# Patient Record
Sex: MALE | Race: WHITE | NOT HISPANIC OR LATINO | Employment: OTHER | URBAN - METROPOLITAN AREA
[De-identification: names, ages, dates, MRNs, and addresses within clinical notes are randomized per-mention and may not be internally consistent; named-entity substitution may affect disease eponyms.]

---

## 2023-08-08 ENCOUNTER — PATIENT OUTREACH (OUTPATIENT)
Dept: CARE COORDINATION | Facility: CLINIC | Age: 88
End: 2023-08-08
Payer: MEDICARE

## 2023-08-08 NOTE — PROGRESS NOTES
Outreach call to patient to support a smooth transition of care from recent admission    Engagement  Call Start Time: 0909 (8/8/2023  9:09 AM)    Patient Teaching  What is the patient's perception of their health status since discharge?: Improving (8/8/2023  9:09 AM)  Is the patient/caregiver able to teach back the hierarchy of who to call/visit for symptoms/problems? PCP, Specialist, Home Health nurse, Urgent Care, ED, 911: Yes (8/8/2023  9:09 AM)    Wrap Up  Wrap Up Additional Comments: Pt states he is doing fine, he has already had his walk this morning and his son is a nurse that looks after him. The pt declined cm and conversa or any fu calls. (8/8/2023  9:09 AM)  Call End Time: 0910 (8/8/2023  9:09 AM)

## 2023-08-23 ENCOUNTER — HOSPITAL ENCOUNTER (OUTPATIENT)
Dept: DATA CONVERSION | Facility: HOSPITAL | Age: 88
Discharge: HOME | End: 2023-08-23
Payer: MEDICARE

## 2023-08-23 DIAGNOSIS — N39.0 URINARY TRACT INFECTION, SITE NOT SPECIFIED: ICD-10-CM

## 2023-08-23 LAB
BACTERIA SPEC CULT: NORMAL
REPORT STATUS -LH SQ DATA CONVERSION: NORMAL
SERVICE CMNT-IMP: NORMAL
SPECIMEN SOURCE: NORMAL

## 2023-09-19 ENCOUNTER — HOSPITAL ENCOUNTER (OUTPATIENT)
Dept: DATA CONVERSION | Facility: HOSPITAL | Age: 88
End: 2023-09-19

## 2023-09-19 DIAGNOSIS — M81.8 OTHER OSTEOPOROSIS WITHOUT CURRENT PATHOLOGICAL FRACTURE: ICD-10-CM

## 2023-09-26 ENCOUNTER — HOSPITAL ENCOUNTER (OUTPATIENT)
Dept: DATA CONVERSION | Facility: HOSPITAL | Age: 88
Discharge: HOME | End: 2023-09-26
Payer: MEDICARE

## 2023-09-26 DIAGNOSIS — M81.8 OTHER OSTEOPOROSIS WITHOUT CURRENT PATHOLOGICAL FRACTURE: ICD-10-CM

## 2023-09-26 DIAGNOSIS — M81.0 AGE-RELATED OSTEOPOROSIS WITHOUT CURRENT PATHOLOGICAL FRACTURE: ICD-10-CM

## 2023-10-26 PROCEDURE — 81001 URINALYSIS AUTO W/SCOPE: CPT

## 2023-10-26 PROCEDURE — 87086 URINE CULTURE/COLONY COUNT: CPT

## 2023-12-19 ENCOUNTER — LAB (OUTPATIENT)
Dept: LAB | Facility: LAB | Age: 88
End: 2023-12-19
Payer: MEDICARE

## 2023-12-19 DIAGNOSIS — K21.9 GASTRO-ESOPHAGEAL REFLUX DISEASE WITHOUT ESOPHAGITIS: ICD-10-CM

## 2023-12-19 DIAGNOSIS — Z79.899 OTHER LONG TERM (CURRENT) DRUG THERAPY: ICD-10-CM

## 2023-12-19 DIAGNOSIS — I48.91 UNSPECIFIED ATRIAL FIBRILLATION (MULTI): ICD-10-CM

## 2023-12-19 DIAGNOSIS — I10 ESSENTIAL (PRIMARY) HYPERTENSION: ICD-10-CM

## 2023-12-19 DIAGNOSIS — N39.0 URINARY TRACT INFECTION, SITE NOT SPECIFIED: Primary | ICD-10-CM

## 2023-12-19 LAB
ALBUMIN SERPL-MCNC: 4.3 G/DL (ref 3.5–5)
ALP BLD-CCNC: 69 U/L (ref 35–125)
ALT SERPL-CCNC: 13 U/L (ref 5–40)
ANION GAP SERPL CALC-SCNC: 12 MMOL/L
AST SERPL-CCNC: 21 U/L (ref 5–40)
BASOPHILS # BLD AUTO: 0.02 X10*3/UL (ref 0–0.1)
BASOPHILS NFR BLD AUTO: 0.4 %
BILIRUB SERPL-MCNC: 1.1 MG/DL (ref 0.1–1.2)
BUN SERPL-MCNC: 22 MG/DL (ref 8–25)
CALCIUM SERPL-MCNC: 9.2 MG/DL (ref 8.5–10.4)
CHLORIDE SERPL-SCNC: 99 MMOL/L (ref 97–107)
CHOLEST SERPL-MCNC: 121 MG/DL (ref 133–200)
CHOLEST/HDLC SERPL: 2.6 {RATIO}
CO2 SERPL-SCNC: 26 MMOL/L (ref 24–31)
CREAT SERPL-MCNC: 0.7 MG/DL (ref 0.4–1.6)
EOSINOPHIL # BLD AUTO: 0.11 X10*3/UL (ref 0–0.4)
EOSINOPHIL NFR BLD AUTO: 2.1 %
ERYTHROCYTE [DISTWIDTH] IN BLOOD BY AUTOMATED COUNT: 13 % (ref 11.5–14.5)
GFR SERPL CREATININE-BSD FRML MDRD: 88 ML/MIN/1.73M*2
GLUCOSE SERPL-MCNC: 84 MG/DL (ref 65–99)
HCT VFR BLD AUTO: 43.2 % (ref 41–52)
HDLC SERPL-MCNC: 47 MG/DL
HGB BLD-MCNC: 15 G/DL (ref 13.5–17.5)
IMM GRANULOCYTES # BLD AUTO: 0 X10*3/UL (ref 0–0.5)
IMM GRANULOCYTES NFR BLD AUTO: 0 % (ref 0–0.9)
LDLC SERPL CALC-MCNC: 51 MG/DL (ref 65–130)
LYMPHOCYTES # BLD AUTO: 1.17 X10*3/UL (ref 0.8–3)
LYMPHOCYTES NFR BLD AUTO: 22.8 %
MCH RBC QN AUTO: 36.1 PG (ref 26–34)
MCHC RBC AUTO-ENTMCNC: 34.7 G/DL (ref 32–36)
MCV RBC AUTO: 104 FL (ref 80–100)
MONOCYTES # BLD AUTO: 0.63 X10*3/UL (ref 0.05–0.8)
MONOCYTES NFR BLD AUTO: 12.3 %
NEUTROPHILS # BLD AUTO: 3.2 X10*3/UL (ref 1.6–5.5)
NEUTROPHILS NFR BLD AUTO: 62.4 %
NRBC BLD-RTO: 0 /100 WBCS (ref 0–0)
PLATELET # BLD AUTO: 176 X10*3/UL (ref 150–450)
POTASSIUM SERPL-SCNC: 4.6 MMOL/L (ref 3.4–5.1)
PROT SERPL-MCNC: 6.4 G/DL (ref 5.9–7.9)
RBC # BLD AUTO: 4.16 X10*6/UL (ref 4.5–5.9)
SODIUM SERPL-SCNC: 137 MMOL/L (ref 133–145)
TRIGL SERPL-MCNC: 117 MG/DL (ref 40–150)
WBC # BLD AUTO: 5.1 X10*3/UL (ref 4.4–11.3)

## 2023-12-19 PROCEDURE — 85025 COMPLETE CBC W/AUTO DIFF WBC: CPT

## 2023-12-19 PROCEDURE — 80061 LIPID PANEL: CPT

## 2023-12-19 PROCEDURE — 80053 COMPREHEN METABOLIC PANEL: CPT

## 2023-12-19 PROCEDURE — 36415 COLL VENOUS BLD VENIPUNCTURE: CPT

## 2023-12-21 RX ORDER — LANOLIN ALCOHOL/MO/W.PET/CERES
1000 CREAM (GRAM) TOPICAL
COMMUNITY
Start: 2019-10-07

## 2023-12-21 RX ORDER — LISINOPRIL 2.5 MG/1
TABLET ORAL
COMMUNITY
Start: 2023-07-23 | End: 2024-01-02 | Stop reason: ALTCHOICE

## 2023-12-21 RX ORDER — APIXABAN 2.5 MG/1
1 TABLET, FILM COATED ORAL 2 TIMES DAILY
COMMUNITY
Start: 2023-03-15

## 2023-12-21 RX ORDER — METOPROLOL SUCCINATE 25 MG/1
25 TABLET, EXTENDED RELEASE ORAL 2 TIMES DAILY
COMMUNITY
Start: 2023-12-04

## 2023-12-21 RX ORDER — PANTOPRAZOLE SODIUM 40 MG/1
40 TABLET, DELAYED RELEASE ORAL
COMMUNITY
Start: 2019-10-07

## 2023-12-21 RX ORDER — ALLOPURINOL 300 MG/1
300 TABLET ORAL
COMMUNITY
Start: 2020-06-18 | End: 2024-05-20 | Stop reason: SDUPTHER

## 2023-12-29 ENCOUNTER — TELEPHONE (OUTPATIENT)
Dept: PRIMARY CARE | Facility: CLINIC | Age: 88
End: 2023-12-29
Payer: MEDICARE

## 2023-12-29 NOTE — TELEPHONE ENCOUNTER
PTS SON CALLED TO LET DDC KNOW THAT HE IS AT [facility] . WENT TO ER WEDNESDAY HAD A + COVID , UTI, AND NOW HEART ISSUES .

## 2024-01-02 ENCOUNTER — PATIENT OUTREACH (OUTPATIENT)
Dept: PRIMARY CARE | Facility: CLINIC | Age: 89
End: 2024-01-02

## 2024-01-02 ENCOUNTER — DOCUMENTATION (OUTPATIENT)
Dept: PRIMARY CARE | Facility: CLINIC | Age: 89
End: 2024-01-02

## 2024-01-02 ENCOUNTER — OFFICE VISIT (OUTPATIENT)
Dept: PRIMARY CARE | Facility: CLINIC | Age: 89
End: 2024-01-02
Payer: MEDICARE

## 2024-01-02 ENCOUNTER — APPOINTMENT (OUTPATIENT)
Dept: PRIMARY CARE | Facility: CLINIC | Age: 89
End: 2024-01-02
Payer: MEDICARE

## 2024-01-02 VITALS
BODY MASS INDEX: 22.88 KG/M2 | HEIGHT: 64 IN | WEIGHT: 134 LBS | HEART RATE: 66 BPM | SYSTOLIC BLOOD PRESSURE: 136 MMHG | DIASTOLIC BLOOD PRESSURE: 78 MMHG | OXYGEN SATURATION: 97 %

## 2024-01-02 DIAGNOSIS — U07.1 COVID-19: ICD-10-CM

## 2024-01-02 DIAGNOSIS — I48.91 ATRIAL FIBRILLATION, UNSPECIFIED TYPE (MULTI): Primary | ICD-10-CM

## 2024-01-02 DIAGNOSIS — N39.0 URINARY TRACT INFECTION WITHOUT HEMATURIA, SITE UNSPECIFIED: ICD-10-CM

## 2024-01-02 PROCEDURE — 3075F SYST BP GE 130 - 139MM HG: CPT | Performed by: FAMILY MEDICINE

## 2024-01-02 PROCEDURE — 93000 ELECTROCARDIOGRAM COMPLETE: CPT | Performed by: FAMILY MEDICINE

## 2024-01-02 PROCEDURE — 1159F MED LIST DOCD IN RCRD: CPT | Performed by: FAMILY MEDICINE

## 2024-01-02 PROCEDURE — 99495 TRANSJ CARE MGMT MOD F2F 14D: CPT | Performed by: FAMILY MEDICINE

## 2024-01-02 PROCEDURE — 1036F TOBACCO NON-USER: CPT | Performed by: FAMILY MEDICINE

## 2024-01-02 PROCEDURE — 1126F AMNT PAIN NOTED NONE PRSNT: CPT | Performed by: FAMILY MEDICINE

## 2024-01-02 PROCEDURE — 3078F DIAST BP <80 MM HG: CPT | Performed by: FAMILY MEDICINE

## 2024-01-02 PROCEDURE — 1160F RVW MEDS BY RX/DR IN RCRD: CPT | Performed by: FAMILY MEDICINE

## 2024-01-02 RX ORDER — CEPHALEXIN 500 MG/1
500 CAPSULE ORAL 4 TIMES DAILY
COMMUNITY

## 2024-01-02 ASSESSMENT — PATIENT HEALTH QUESTIONNAIRE - PHQ9
SUM OF ALL RESPONSES TO PHQ9 QUESTIONS 1 AND 2: 0
1. LITTLE INTEREST OR PLEASURE IN DOING THINGS: NOT AT ALL
2. FEELING DOWN, DEPRESSED OR HOPELESS: NOT AT ALL

## 2024-01-02 ASSESSMENT — PAIN SCALES - GENERAL: PAINLEVEL: 0-NO PAIN

## 2024-01-02 NOTE — PROGRESS NOTES
"Subjective   Patient ID: Kavon Bowers is a 89 y.o. male who presents for Follow-up ([facility] hospital for COVID & UTI).    HPI   Kavon Presents with son who gives most of the history.  Admitted to the [facility] in December 27 secondary to respiratory distress from COVID-19 infection.  He was also found to be in atrial fibrillation with a questionable Mobitz 1 seen on telemetry.  He had some generalized weakness.  He was evaluated by cardiology who felt it was just atrial fibrillation and therefore no further intervention was needed.  Treated with antivirals.  Gradually improved his strength and therefore was discharged home on December 30.  Since being at home he still feels intermittently weak.  No shortness of breath.  Slight cough.  He is eating better.Still having episodes of lightheaded or dizzy feeling.  No chest pains or palpitations.  They note that his heart rate dips in the 40s when he is feeling lightheaded or dizzy.  Blood pressure is running 128-140/70-80.Pulse ox has remained above 92    Review of Systems  All other systems have been reviewed and are negative except as noted in the HPI.     Objective   /78   Pulse 66   Ht 1.613 m (5' 3.5\")   Wt 60.8 kg (134 lb)   SpO2 97%   BMI 23.36 kg/m²     Physical Exam  Alert.  No acute distress.  Lungs are clear to auscultation bilaterally with good air movement.  Heart was irregularly irregular with a rate of 64.  Gait was steady.  No episodes of lightheaded or dizziness when going from sitting to standing or when walking to leave the office.    Assessment/Plan   Problem List Items Addressed This Visit             ICD-10-CM    Atrial fibrillation (CMS/[facility]) - Primary I48.91     Given his episodes of bradycardia along with symptoms of lightheadedness recommend holding his Toprol for the next 24 hours.  Will continue to monitor his blood pressure during this time and call if it is above 150/90.  In addition a copy of EKG was faxed to Dr. Mendoza.  They " will call him for an appointment to be seen in the next week         Relevant Orders    ECG 12 Lead (Completed)     Other Visit Diagnoses         Codes    COVID-19      Symptoms are improving.  Discussed that he does not need to test as he will continue to be positive.  He will wear a mask for an additional 5 days. U07.1    Urinary tract infection without hematuria, site unspecified      No current symptoms.  Order given in case he develops symptoms given his history of recurrent UTIs N39.0    Relevant Orders    Urinalysis with Reflex Culture and Microscopic

## 2024-01-02 NOTE — PROGRESS NOTES
Discharge Facility: [facility]  Discharge Diagnosis: Heart block  Admission Date: 12/27/2023  Discharge Date: 12/30/2023    PCP Appointment Date: 01/02/2024  Specialist Appointment Date: None  Hospital Encounter and Summary: Linked    See discharge assessment below for further details    Engagement  Call Start Time: 0950 (1/2/2024  9:59 AM)    Medications  Medications reviewed with patient/caregiver?: Yes (1/2/2024  9:59 AM)  Is the patient having any side effects they believe may be caused by any medication additions or changes?: No (1/2/2024  9:59 AM)  Does the patient have all medications ordered at discharge?: Yes (1/2/2024  9:59 AM)  Prescription Comments: No new scripts given at discharge (1/2/2024  9:59 AM)  Is the patient taking all medications as directed (includes completed medication regime)?: Yes (1/2/2024  9:59 AM)  Medication Comments: Patients wife denies any issues affording medication (1/2/2024  9:59 AM)    Appointments  Does the patient have a primary care provider?: Yes (1/2/2024  9:59 AM)  Care Management Interventions: Verified appointment date/time/provider (1/2/2024  9:59 AM)  Has the patient kept scheduled appointments due by today?: Yes (1/2/2024  9:59 AM)    Self Management  What is the home health agency?: N/A (1/2/2024  9:59 AM)  What Durable Medical Equipment (DME) was ordered?: N/A (1/2/2024  9:59 AM)    Patient Teaching  Does the patient have access to their discharge instructions?: Yes (1/2/2024  9:59 AM)  Care Management Interventions: Reviewed instructions with patient (1/2/2024  9:59 AM)  Is the patient/caregiver able to teach back the hierarchy of who to call/visit for symptoms/problems? PCP, Specialist, Home Health nurse, Urgent Care, ED, 911: Yes (1/2/2024  9:59 AM)  Patient/Caregiver Education Comments: CM spoke to patients abiola Nazario via phone. She states that he is doing okay at home but does admit to some fatigue. He has a PCP follow up appointment scheduled for today  01/02/2024 at 2:30. Mansi does not believe that this is correct. This CM called son Eliezer to discuss appointment date and time. Voicemail message was left for Eliezer requesting a return call. She has no questions at this time. (1/2/2024  9:59 AM)

## 2024-01-03 NOTE — ASSESSMENT & PLAN NOTE
Given his episodes of bradycardia along with symptoms of lightheadedness recommend holding his Toprol for the next 24 hours.  Will continue to monitor his blood pressure during this time and call if it is above 150/90.  In addition a copy of EKG was faxed to Dr. Mendoza.  They will call him for an appointment to be seen in the next week

## 2024-01-10 ENCOUNTER — TELEPHONE (OUTPATIENT)
Dept: PRIMARY CARE | Facility: CLINIC | Age: 89
End: 2024-01-10
Payer: MEDICARE

## 2024-01-10 DIAGNOSIS — I48.91 ATRIAL FIBRILLATION, UNSPECIFIED TYPE (MULTI): ICD-10-CM

## 2024-01-11 ENCOUNTER — TELEPHONE (OUTPATIENT)
Dept: PRIMARY CARE | Facility: CLINIC | Age: 89
End: 2024-01-11
Payer: MEDICARE

## 2024-01-11 ENCOUNTER — PATIENT OUTREACH (OUTPATIENT)
Dept: PRIMARY CARE | Facility: CLINIC | Age: 89
End: 2024-01-11
Payer: MEDICARE

## 2024-01-11 NOTE — TELEPHONE ENCOUNTER
Pts son called Jayson [phone] they need the phone # to have his heart monitor placed, he lost the #

## 2024-01-11 NOTE — TELEPHONE ENCOUNTER
SHUKRI HENNESSY ,   HE CALLED ABOUT HEART MONITOR ,  IT TAKES  2 WEEKS TO GET ONE , IS THERE  ANYTHING ELSE WE CAN DO TO MAKE THIS HAPPEN SOONER ?  [phone]

## 2024-01-11 NOTE — TELEPHONE ENCOUNTER
EDE FROM  TRANSITIONAL CARE IS CALLING BECAUSE THE PATIENT DOES NOT KNOW WHAT NUMBER TO CALL TO HAVE THE KRISH PATCH PLACED FOR HIM AT HIS HOME.  HE THOUGHT IT WAS ON HIS MACHINE BUT IT IS NOT. EDE ASKED IF WE COULD REACH OUT TO THE PATIENT.

## 2024-01-25 ENCOUNTER — APPOINTMENT (OUTPATIENT)
Dept: CARDIOLOGY | Facility: CLINIC | Age: 89
End: 2024-01-25
Payer: MEDICARE

## 2024-01-31 ENCOUNTER — OFFICE VISIT (OUTPATIENT)
Dept: PRIMARY CARE | Facility: CLINIC | Age: 89
End: 2024-01-31
Payer: MEDICARE

## 2024-01-31 ENCOUNTER — LAB (OUTPATIENT)
Dept: LAB | Facility: LAB | Age: 89
End: 2024-01-31
Payer: MEDICARE

## 2024-01-31 VITALS
WEIGHT: 137 LBS | HEART RATE: 57 BPM | SYSTOLIC BLOOD PRESSURE: 140 MMHG | OXYGEN SATURATION: 98 % | BODY MASS INDEX: 23.89 KG/M2 | DIASTOLIC BLOOD PRESSURE: 84 MMHG

## 2024-01-31 DIAGNOSIS — R60.0 LOCALIZED EDEMA: ICD-10-CM

## 2024-01-31 DIAGNOSIS — N39.0 RECURRENT UTI: ICD-10-CM

## 2024-01-31 DIAGNOSIS — I11.0 HYPERTENSIVE HEART DISEASE WITH HEART FAILURE (MULTI): ICD-10-CM

## 2024-01-31 DIAGNOSIS — I48.11 LONGSTANDING PERSISTENT ATRIAL FIBRILLATION (MULTI): Primary | ICD-10-CM

## 2024-01-31 DIAGNOSIS — E87.1 HYPONATREMIA: ICD-10-CM

## 2024-01-31 DIAGNOSIS — N39.0 URINARY TRACT INFECTION WITHOUT HEMATURIA, SITE UNSPECIFIED: ICD-10-CM

## 2024-01-31 DIAGNOSIS — I48.11 LONGSTANDING PERSISTENT ATRIAL FIBRILLATION (MULTI): ICD-10-CM

## 2024-01-31 LAB
ALBUMIN SERPL-MCNC: 4 G/DL (ref 3.5–5)
ALP BLD-CCNC: 69 U/L (ref 35–125)
ALT SERPL-CCNC: 15 U/L (ref 5–40)
ANION GAP SERPL CALC-SCNC: 11 MMOL/L
APPEARANCE UR: CLEAR
AST SERPL-CCNC: 17 U/L (ref 5–40)
BILIRUB SERPL-MCNC: 0.6 MG/DL (ref 0.1–1.2)
BILIRUB UR STRIP.AUTO-MCNC: NEGATIVE MG/DL
BNP SERPL-MCNC: 527 PG/ML (ref 0–99)
BUN SERPL-MCNC: 18 MG/DL (ref 8–25)
CALCIUM SERPL-MCNC: 8.7 MG/DL (ref 8.5–10.4)
CHLORIDE SERPL-SCNC: 102 MMOL/L (ref 97–107)
CO2 SERPL-SCNC: 24 MMOL/L (ref 24–31)
COLOR UR: ABNORMAL
CREAT SERPL-MCNC: 0.7 MG/DL (ref 0.4–1.6)
EGFRCR SERPLBLD CKD-EPI 2021: 87 ML/MIN/1.73M*2
GLUCOSE SERPL-MCNC: 89 MG/DL (ref 65–99)
GLUCOSE UR STRIP.AUTO-MCNC: NORMAL MG/DL
KETONES UR STRIP.AUTO-MCNC: NEGATIVE MG/DL
LEUKOCYTE ESTERASE UR QL STRIP.AUTO: ABNORMAL
NITRITE UR QL STRIP.AUTO: NEGATIVE
PH UR STRIP.AUTO: 6 [PH]
POTASSIUM SERPL-SCNC: 4.6 MMOL/L (ref 3.4–5.1)
PROT SERPL-MCNC: 6.4 G/DL (ref 5.9–7.9)
PROT UR STRIP.AUTO-MCNC: NEGATIVE MG/DL
RBC # UR STRIP.AUTO: NEGATIVE /UL
RBC #/AREA URNS AUTO: ABNORMAL /HPF
SODIUM SERPL-SCNC: 137 MMOL/L (ref 133–145)
SP GR UR STRIP.AUTO: 1.02
UROBILINOGEN UR STRIP.AUTO-MCNC: NORMAL MG/DL
WBC #/AREA URNS AUTO: ABNORMAL /HPF

## 2024-01-31 PROCEDURE — 81001 URINALYSIS AUTO W/SCOPE: CPT

## 2024-01-31 PROCEDURE — 3077F SYST BP >= 140 MM HG: CPT | Performed by: FAMILY MEDICINE

## 2024-01-31 PROCEDURE — 99214 OFFICE O/P EST MOD 30 MIN: CPT | Performed by: FAMILY MEDICINE

## 2024-01-31 PROCEDURE — 1036F TOBACCO NON-USER: CPT | Performed by: FAMILY MEDICINE

## 2024-01-31 PROCEDURE — 1126F AMNT PAIN NOTED NONE PRSNT: CPT | Performed by: FAMILY MEDICINE

## 2024-01-31 PROCEDURE — 1159F MED LIST DOCD IN RCRD: CPT | Performed by: FAMILY MEDICINE

## 2024-01-31 PROCEDURE — 83880 ASSAY OF NATRIURETIC PEPTIDE: CPT

## 2024-01-31 PROCEDURE — 36415 COLL VENOUS BLD VENIPUNCTURE: CPT

## 2024-01-31 PROCEDURE — 3079F DIAST BP 80-89 MM HG: CPT | Performed by: FAMILY MEDICINE

## 2024-01-31 PROCEDURE — 80053 COMPREHEN METABOLIC PANEL: CPT

## 2024-01-31 PROCEDURE — 1160F RVW MEDS BY RX/DR IN RCRD: CPT | Performed by: FAMILY MEDICINE

## 2024-01-31 PROCEDURE — 87086 URINE CULTURE/COLONY COUNT: CPT

## 2024-01-31 ASSESSMENT — PAIN SCALES - GENERAL: PAINLEVEL: 0-NO PAIN

## 2024-01-31 ASSESSMENT — PATIENT HEALTH QUESTIONNAIRE - PHQ9
2. FEELING DOWN, DEPRESSED OR HOPELESS: NOT AT ALL
SUM OF ALL RESPONSES TO PHQ9 QUESTIONS 1 AND 2: 0
1. LITTLE INTEREST OR PLEASURE IN DOING THINGS: NOT AT ALL

## 2024-02-01 LAB — HOLD SPECIMEN: NORMAL

## 2024-02-02 LAB — BACTERIA UR CULT: NO GROWTH

## 2024-02-04 NOTE — PROGRESS NOTES
Subjective   Patient ID: Kavon Bowers is a 89 y.o. male who presents for Follow-up (hospital).    HPI   Kavon presents with son for hospital follow up.  He developed worsening dizziness and lightheaded feeling thus taken downtown.  He was unable to get the Zio patch prior to that admission.  During hospital stay they monitored and again he had episodes of wandering atrial pacemaker and Mobitz.  Now has Zio patch on for further evaluation.  Has follow up scheduled with cardiology.  Had stopped the cephalexin as his culture was negative but had some delirium and found to have UTI.  Tx'd with abx    Review of Systems  All other systems have been reviewed and are negative except as noted in the HPI.       Objective   /84   Pulse 57   Wt 62.1 kg (137 lb)   SpO2 98%   BMI 23.89 kg/m²     Physical Exam  Vitals and nursing note reviewed.   Constitutional:       Appearance: Normal appearance.   Eyes:      Extraocular Movements: Extraocular movements intact.      Conjunctiva/sclera: Conjunctivae normal.      Pupils: Pupils are equal, round, and reactive to light.   Cardiovascular:      Rate and Rhythm: Normal rate and regular rhythm.      Heart sounds: No murmur heard.  Pulmonary:      Effort: Pulmonary effort is normal.      Breath sounds: Normal breath sounds.   Neurological:      General: No focal deficit present.      Mental Status: He is alert.   Psychiatric:         Mood and Affect: Mood normal.         Assessment/Plan   Diagnoses and all orders for this visit:  Longstanding persistent atrial fibrillation (CMS/HCC)  -     Comprehensive Metabolic Panel; Future  -     B-type natriuretic peptide; Future  - Await Zio and follow up with cardiologist  Localized edema  -     Comprehensive Metabolic Panel; Future  -     B-type natriuretic peptide; Future  - Await labs  Hyponatremia  -     Comprehensive Metabolic Panel; Future  -     B-type natriuretic peptide; Future  - Needs recheck since discharge  Hypertensive  heart disease with heart failure (CMS/HCC)  -     B-type natriuretic peptide; Future  Recurrent UTI  -     Urinalysis with Reflex Culture and Microscopic; Future  - Recommend that he resume keflex 500mg daily to prevent recurrence

## 2024-02-12 DIAGNOSIS — M81.0 OSTEOPOROSIS, UNSPECIFIED OSTEOPOROSIS TYPE, UNSPECIFIED PATHOLOGICAL FRACTURE PRESENCE: Primary | ICD-10-CM

## 2024-02-12 RX ORDER — EPINEPHRINE 0.3 MG/.3ML
0.3 INJECTION SUBCUTANEOUS EVERY 5 MIN PRN
Status: CANCELLED | OUTPATIENT
Start: 2024-02-12

## 2024-02-12 RX ORDER — DIPHENHYDRAMINE HYDROCHLORIDE 50 MG/ML
50 INJECTION INTRAMUSCULAR; INTRAVENOUS AS NEEDED
Status: CANCELLED | OUTPATIENT
Start: 2024-02-12

## 2024-02-12 RX ORDER — ALBUTEROL SULFATE 0.83 MG/ML
3 SOLUTION RESPIRATORY (INHALATION) AS NEEDED
Status: CANCELLED | OUTPATIENT
Start: 2024-02-12

## 2024-02-12 RX ORDER — FAMOTIDINE 10 MG/ML
20 INJECTION INTRAVENOUS ONCE AS NEEDED
Status: CANCELLED | OUTPATIENT
Start: 2024-02-12

## 2024-02-14 ENCOUNTER — DOCUMENTATION (OUTPATIENT)
Dept: INFUSION THERAPY | Facility: CLINIC | Age: 89
End: 2024-02-14
Payer: MEDICARE

## 2024-02-14 NOTE — PROGRESS NOTES
"  Patient to be scheduled for Continuation of Prolia injections.  For Diagnosis: Osteoporosis    Labs..  Calcium drawn on:   Lab Results   Component Value Date    CALCIUM 8.7 01/31/2024    No results found for: \"CAION\"  (>8.6mg/dl or ionized calcium WNL.  Hold / Contact provider if <8.6) (must be within 28 days of scheduled injection)  CRCL 45  Calcium and Vitamin D supplement on medication list? No    Current Outpatient Medications:     allopurinol (Zyloprim) 300 mg tablet, Take 1 tablet (300 mg) by mouth once daily., Disp: , Rfl:     cephalexin (Keflex) 500 mg capsule, Take 1 capsule (500 mg) by mouth 4 times a day., Disp: , Rfl:     cyanocobalamin (Vitamin B-12) 1,000 mcg tablet, Take 1 tablet (1,000 mcg) by mouth once daily., Disp: , Rfl:     Eliquis 2.5 mg tablet, Take 1 tablet (2.5 mg) by mouth 2 times a day., Disp: , Rfl:     metoprolol succinate XL (Toprol-XL) 25 mg 24 hr tablet, Take 1 tablet (25 mg) by mouth twice a day., Disp: , Rfl:     pantoprazole (ProtoNix) 40 mg EC tablet, Take 1 tablet (40 mg) by mouth once daily., Disp: , Rfl:    (if no nurse to encourage discussion of supplementation at visit)    No obvious recent dental work per chart review. Nurse to confirm no dental within past/next 4 weeks at encounter.  Urine Hcg test ordered?Not applicable (If female pt <60 years of age and with reproductive capability)  (If urine Hcg test ordered please instruct pt upon scheduling to drink 32 ounces of water 1 hour before arrival so bladder is full for needed urine sample)    Last injection received: 9/19/23 (if continuation)    Due: March 2024    This result meets treatment criteria. Ok to schedule for prolia within 28 days of the calcium lab draw date listed above. OR… Ok to schedule for prolia; please instruct pt to have labs drawn no more than 28 days prior to their scheduled appointment   "

## 2024-05-20 ENCOUNTER — TELEPHONE (OUTPATIENT)
Dept: PRIMARY CARE | Facility: CLINIC | Age: 89
End: 2024-05-20
Payer: MEDICARE

## 2024-05-20 DIAGNOSIS — M10.9 GOUT, UNSPECIFIED CAUSE, UNSPECIFIED CHRONICITY, UNSPECIFIED SITE: ICD-10-CM

## 2024-05-20 RX ORDER — ALLOPURINOL 300 MG/1
300 TABLET ORAL
Qty: 90 TABLET | Refills: 0 | Status: SHIPPED | OUTPATIENT
Start: 2024-05-20

## 2024-05-20 NOTE — TELEPHONE ENCOUNTER
Rx Refill Request Telephone Encounter    Name:  Kavon Bowers  :  [id number]  Medication Name:  Allopurinol 300 mg            Specific Pharmacy location:  [facility]   Date of last appointment:    Date of next appointment:    Best number to reach patient

## 2024-07-19 ENCOUNTER — TELEPHONE (OUTPATIENT)
Dept: PRIMARY CARE | Facility: CLINIC | Age: 89
End: 2024-07-19
Payer: MEDICARE

## 2024-07-19 DIAGNOSIS — R73.01 IMPAIRED FASTING GLUCOSE: ICD-10-CM

## 2024-07-19 DIAGNOSIS — I48.0 PAROXYSMAL ATRIAL FIBRILLATION WITH RVR (MULTI): ICD-10-CM

## 2024-07-19 DIAGNOSIS — M10.9 GOUT, UNSPECIFIED CAUSE, UNSPECIFIED CHRONICITY, UNSPECIFIED SITE: ICD-10-CM

## 2024-07-19 DIAGNOSIS — M81.0 OSTEOPOROSIS, UNSPECIFIED OSTEOPOROSIS TYPE, UNSPECIFIED PATHOLOGICAL FRACTURE PRESENCE: ICD-10-CM

## 2024-07-19 DIAGNOSIS — I10 ESSENTIAL HYPERTENSION: ICD-10-CM

## 2024-07-22 PROBLEM — I11.0 HYPERTENSIVE HEART DISEASE WITH HEART FAILURE (MULTI): Status: ACTIVE | Noted: 2024-01-31

## 2024-08-12 ENCOUNTER — TELEPHONE (OUTPATIENT)
Dept: PRIMARY CARE | Facility: CLINIC | Age: 89
End: 2024-08-12
Payer: MEDICARE

## 2024-08-12 DIAGNOSIS — M10.9 GOUT, UNSPECIFIED CAUSE, UNSPECIFIED CHRONICITY, UNSPECIFIED SITE: ICD-10-CM

## 2024-08-12 RX ORDER — ALLOPURINOL 300 MG/1
300 TABLET ORAL
Qty: 90 TABLET | Refills: 0 | Status: SHIPPED | OUTPATIENT
Start: 2024-08-12

## 2024-08-12 NOTE — TELEPHONE ENCOUNTER
Refill     Allopurinol 300 MG    Pharmacy [facility] in [place]    Patient can be reached at [phone]

## 2024-08-29 ENCOUNTER — OFFICE VISIT (OUTPATIENT)
Dept: PRIMARY CARE | Facility: CLINIC | Age: 89
End: 2024-08-29
Payer: MEDICARE

## 2024-08-29 VITALS
BODY MASS INDEX: 23.19 KG/M2 | OXYGEN SATURATION: 98 % | SYSTOLIC BLOOD PRESSURE: 130 MMHG | WEIGHT: 133 LBS | DIASTOLIC BLOOD PRESSURE: 78 MMHG | HEART RATE: 118 BPM | TEMPERATURE: 97.6 F

## 2024-08-29 DIAGNOSIS — R26.81 GAIT INSTABILITY: Primary | ICD-10-CM

## 2024-08-29 PROCEDURE — 1123F ACP DISCUSS/DSCN MKR DOCD: CPT | Performed by: FAMILY MEDICINE

## 2024-08-29 PROCEDURE — 1158F ADVNC CARE PLAN TLK DOCD: CPT | Performed by: FAMILY MEDICINE

## 2024-08-29 PROCEDURE — 3078F DIAST BP <80 MM HG: CPT | Performed by: FAMILY MEDICINE

## 2024-08-29 PROCEDURE — 1126F AMNT PAIN NOTED NONE PRSNT: CPT | Performed by: FAMILY MEDICINE

## 2024-08-29 PROCEDURE — 1159F MED LIST DOCD IN RCRD: CPT | Performed by: FAMILY MEDICINE

## 2024-08-29 PROCEDURE — 3075F SYST BP GE 130 - 139MM HG: CPT | Performed by: FAMILY MEDICINE

## 2024-08-29 PROCEDURE — 1160F RVW MEDS BY RX/DR IN RCRD: CPT | Performed by: FAMILY MEDICINE

## 2024-08-29 PROCEDURE — 99213 OFFICE O/P EST LOW 20 MIN: CPT | Performed by: FAMILY MEDICINE

## 2024-08-29 ASSESSMENT — PAIN SCALES - GENERAL: PAINLEVEL: 0-NO PAIN

## 2024-08-29 ASSESSMENT — PATIENT HEALTH QUESTIONNAIRE - PHQ9
SUM OF ALL RESPONSES TO PHQ9 QUESTIONS 1 AND 2: 0
2. FEELING DOWN, DEPRESSED OR HOPELESS: NOT AT ALL
1. LITTLE INTEREST OR PLEASURE IN DOING THINGS: NOT AT ALL

## 2024-08-29 NOTE — PROGRESS NOTES
Subjective   Patient ID: Kavon Bowers is a 89 y.o. male who presents for Follow-up (Er visit 8/24/24).    HPI  Kavon presents for follow-up of the emergency room.  He fell into the bushes outside and had a stick entering to the palm of his hand.  1 dissolvable stitch placed.  He is doing well overall.  No redness or drainage from any of the abrasions or the puncture wound.  He does admit to being off balance more so with his walking.  ER note reviewed in detail.    Review of Systems  All other systems have been reviewed and are negative except as noted in the HPI.       Objective   Vital Signs:  /78 (BP Location: Left arm)   Pulse (!) 118   Temp 36.4 °C (97.6 °F) (Temporal)   Wt 60.3 kg (133 lb)   SpO2 98%   BMI 23.19 kg/m²     Physical Exam  Alert.  No acute distress.  Gait is slightly unsteady.  Better with support from daughter.  Assessment & Plan  Gait instability  With fall.  Recommend physical therapy.  Will need to be done in the house due to his difficulty leaving without assistance.  Post care instructions given regarding the abrasions keeping them clean and dry.  No longer needs to keep the hand wrapped.  Orders:    Referral to Home Care; Future    ECG 12 lead (Clinic Performed)        Follow up PRN, sooner with any problems or concerns.

## 2024-09-03 ENCOUNTER — DOCUMENTATION (OUTPATIENT)
Dept: HOME HEALTH SERVICES | Facility: HOME HEALTH | Age: 89
End: 2024-09-03
Payer: MEDICARE

## 2024-09-03 ENCOUNTER — HOME HEALTH ADMISSION (OUTPATIENT)
Dept: HOME HEALTH SERVICES | Facility: HOME HEALTH | Age: 89
End: 2024-09-03
Payer: MEDICARE

## 2024-09-03 NOTE — HH CARE COORDINATION
Home Care received a Referral for Physical Therapy. We have processed the referral for a Start of Care on 09/04-09/05.     If you have any questions or concerns, please feel free to contact us at [phone]. Follow the prompts, enter your five digit zip code, and you will be directed to your care team on EAST 1.

## 2024-09-04 ENCOUNTER — HOME CARE VISIT (OUTPATIENT)
Dept: HOME HEALTH SERVICES | Facility: HOME HEALTH | Age: 89
End: 2024-09-04
Payer: MEDICARE

## 2024-09-04 VITALS
TEMPERATURE: 98.1 F | RESPIRATION RATE: 36 BRPM | SYSTOLIC BLOOD PRESSURE: 134 MMHG | DIASTOLIC BLOOD PRESSURE: 86 MMHG | HEART RATE: 139 BPM | OXYGEN SATURATION: 96 %

## 2024-09-04 PROCEDURE — G0151 HHCP-SERV OF PT,EA 15 MIN: HCPCS | Mod: HHH

## 2024-09-04 PROCEDURE — 169592 NO-PAY CLAIM PROCEDURE

## 2024-09-04 ASSESSMENT — ENCOUNTER SYMPTOMS: OCCASIONAL FEELINGS OF UNSTEADINESS: 0

## 2024-09-06 SDOH — HEALTH STABILITY: PHYSICAL HEALTH: EXERCISE COMMENTS: R TO IMPROVE NM CONTROL AND FUNCTIONAL PERFORMANCE.

## 2024-09-06 SDOH — HEALTH STABILITY: PHYSICAL HEALTH
EXERCISE COMMENTS: PT INSTRUCTED PATIENT IN SEATED EXERCISES IN ORDER TO IMPROVE LE STRENGTH AND FUNCTIONAL PERFORMANCE, 1X10:  - KNEE EXTENSION  - KNEE FLEXION  - HIP FLEXION  - HIP ABDUCTION/ADDUCTION  - PF/DF    PT EDUCATED PATIENT TO PERFORM EXERCISES DAILY IN ORDE

## 2024-09-06 ASSESSMENT — ACTIVITIES OF DAILY LIVING (ADL)
ENTERING_EXITING_HOME: CONTACT GUARD ASSIST
OASIS_M1830: 03

## 2024-09-09 ENCOUNTER — HOME CARE VISIT (OUTPATIENT)
Dept: HOME HEALTH SERVICES | Facility: HOME HEALTH | Age: 89
End: 2024-09-09
Payer: MEDICARE

## 2024-09-09 PROCEDURE — G0151 HHCP-SERV OF PT,EA 15 MIN: HCPCS | Mod: HHH

## 2024-09-10 VITALS
SYSTOLIC BLOOD PRESSURE: 112 MMHG | DIASTOLIC BLOOD PRESSURE: 102 MMHG | TEMPERATURE: 98.6 F | OXYGEN SATURATION: 99 % | RESPIRATION RATE: 30 BRPM | HEART RATE: 125 BPM

## 2024-09-10 SDOH — HEALTH STABILITY: PHYSICAL HEALTH: EXERCISE COMMENTS: ON.     B, STANDING AT COUNTER 1 X 10:  - SLR  - HIP EXT  - HIP ABD  - KNEE FLEXION  - ANKLE PF

## 2024-09-10 SDOH — HEALTH STABILITY: PHYSICAL HEALTH
EXERCISE COMMENTS: PATIENT REQUIRED VC’S/TC’S TO REDUCE HIP FLEXION AND LUMBAR FLEXION COMPENSATION WITH STANDING EXERCISES. PATIENT HAS DECREASED HIP AND BLE STRENGTH. HE REQUIRED SUPERVISION AND CGA AT HIS COUNTERTOP FOR ALL ACTIVITIES. PATIENT WAS CHALLENGED WITH TH

## 2024-09-10 SDOH — HEALTH STABILITY: PHYSICAL HEALTH
EXERCISE COMMENTS: EREX BUT TOLERATED THEM WELL. PATIENT WAS EDUCATED TO PERFORM STANDING THEREX AT COUNTERTOP. PATIENT WAS INSTRUCTED NOT TO PERFORM BALANCE ACTIVITES UNLESS PT IS PRESENT IN ORDER TO ENSURE PATIENT SAFETY. HE WAS APPROPRIATELY FATIGUED BY END OF SESSI

## 2024-09-10 ASSESSMENT — ENCOUNTER SYMPTOMS: DENIES PAIN: 1

## 2024-09-12 ENCOUNTER — HOME CARE VISIT (OUTPATIENT)
Dept: HOME HEALTH SERVICES | Facility: HOME HEALTH | Age: 89
End: 2024-09-12
Payer: MEDICARE

## 2024-09-12 VITALS
OXYGEN SATURATION: 98 % | HEART RATE: 111 BPM | RESPIRATION RATE: 30 BRPM | DIASTOLIC BLOOD PRESSURE: 82 MMHG | SYSTOLIC BLOOD PRESSURE: 142 MMHG | TEMPERATURE: 97.8 F

## 2024-09-12 PROCEDURE — G0151 HHCP-SERV OF PT,EA 15 MIN: HCPCS | Mod: HHH

## 2024-09-16 ENCOUNTER — HOME CARE VISIT (OUTPATIENT)
Dept: HOME HEALTH SERVICES | Facility: HOME HEALTH | Age: 89
End: 2024-09-16
Payer: MEDICARE

## 2024-09-16 VITALS
HEART RATE: 99 BPM | SYSTOLIC BLOOD PRESSURE: 136 MMHG | TEMPERATURE: 97.7 F | DIASTOLIC BLOOD PRESSURE: 80 MMHG | OXYGEN SATURATION: 97 % | RESPIRATION RATE: 34 BRPM

## 2024-09-16 PROCEDURE — G0151 HHCP-SERV OF PT,EA 15 MIN: HCPCS | Mod: HHH

## 2024-09-16 SDOH — HEALTH STABILITY: PHYSICAL HEALTH: EXERCISE COMMENTS: 10:  - B ANKLE DF WITH GREEN TB

## 2024-09-16 SDOH — HEALTH STABILITY: PHYSICAL HEALTH
EXERCISE COMMENTS: PATIENT PERFORMED THE FOLLOWING EXERCISES TO IMPROVE STRENGTH AND ENDURANCE TO IMPROVE GAIT AND REDUCE FALL RISK:    STANDING AT COUNTERTOP WITH GAITBELT AND SBA, 1 X 10, WITH GREEN TB:   - HIP EXTENSION  - HIP ABDUCTION  - MINI SQUATS    SEATED, 1 X

## 2024-09-16 ASSESSMENT — ENCOUNTER SYMPTOMS: DENIES PAIN: 1

## 2024-09-18 ENCOUNTER — HOME CARE VISIT (OUTPATIENT)
Dept: HOME HEALTH SERVICES | Facility: HOME HEALTH | Age: 89
End: 2024-09-18
Payer: MEDICARE

## 2024-09-18 VITALS
HEART RATE: 79 BPM | RESPIRATION RATE: 26 BRPM | SYSTOLIC BLOOD PRESSURE: 151 MMHG | TEMPERATURE: 98 F | OXYGEN SATURATION: 96 % | DIASTOLIC BLOOD PRESSURE: 82 MMHG

## 2024-09-18 PROCEDURE — G0151 HHCP-SERV OF PT,EA 15 MIN: HCPCS | Mod: HHH

## 2024-09-23 ENCOUNTER — HOME CARE VISIT (OUTPATIENT)
Dept: HOME HEALTH SERVICES | Facility: HOME HEALTH | Age: 89
End: 2024-09-23
Payer: MEDICARE

## 2024-09-23 VITALS
DIASTOLIC BLOOD PRESSURE: 88 MMHG | TEMPERATURE: 97.7 F | RESPIRATION RATE: 22 BRPM | SYSTOLIC BLOOD PRESSURE: 147 MMHG | HEART RATE: 61 BPM | OXYGEN SATURATION: 99 %

## 2024-09-23 PROCEDURE — G0151 HHCP-SERV OF PT,EA 15 MIN: HCPCS | Mod: HHH

## 2024-09-23 ASSESSMENT — ENCOUNTER SYMPTOMS
DENIES PAIN: 1
PERSON REPORTING PAIN: PATIENT

## 2024-09-24 ENCOUNTER — LAB (OUTPATIENT)
Dept: LAB | Facility: LAB | Age: 89
End: 2024-09-24
Payer: MEDICARE

## 2024-09-24 DIAGNOSIS — I48.0 PAROXYSMAL ATRIAL FIBRILLATION WITH RVR (MULTI): ICD-10-CM

## 2024-09-24 DIAGNOSIS — M10.9 GOUT, UNSPECIFIED CAUSE, UNSPECIFIED CHRONICITY, UNSPECIFIED SITE: ICD-10-CM

## 2024-09-24 DIAGNOSIS — I10 ESSENTIAL HYPERTENSION: ICD-10-CM

## 2024-09-24 DIAGNOSIS — M81.0 OSTEOPOROSIS, UNSPECIFIED OSTEOPOROSIS TYPE, UNSPECIFIED PATHOLOGICAL FRACTURE PRESENCE: ICD-10-CM

## 2024-09-24 DIAGNOSIS — R73.01 IMPAIRED FASTING GLUCOSE: ICD-10-CM

## 2024-09-24 LAB
25(OH)D3 SERPL-MCNC: 65 NG/ML (ref 30–100)
ALBUMIN SERPL BCP-MCNC: 4.1 G/DL (ref 3.4–5)
ALP SERPL-CCNC: 97 U/L (ref 33–136)
ALT SERPL W P-5'-P-CCNC: 38 U/L (ref 10–52)
ANION GAP SERPL CALCULATED.3IONS-SCNC: 9 MMOL/L (ref 10–20)
APPEARANCE UR: CLEAR
AST SERPL W P-5'-P-CCNC: 39 U/L (ref 9–39)
BASOPHILS # BLD AUTO: 0.03 X10*3/UL (ref 0–0.1)
BASOPHILS NFR BLD AUTO: 0.7 %
BILIRUB SERPL-MCNC: 1.2 MG/DL (ref 0–1.2)
BILIRUB UR STRIP.AUTO-MCNC: NEGATIVE MG/DL
BUN SERPL-MCNC: 14 MG/DL (ref 6–23)
CALCIUM SERPL-MCNC: 8.7 MG/DL (ref 8.6–10.3)
CHLORIDE SERPL-SCNC: 98 MMOL/L (ref 98–107)
CHOLEST SERPL-MCNC: 106 MG/DL (ref 0–199)
CHOLEST/HDLC SERPL: 2.3 {RATIO}
CO2 SERPL-SCNC: 29 MMOL/L (ref 21–32)
COLOR UR: ABNORMAL
CREAT SERPL-MCNC: 0.68 MG/DL (ref 0.5–1.3)
CREAT UR-MCNC: 26.7 MG/DL (ref 20–370)
EGFRCR SERPLBLD CKD-EPI 2021: 88 ML/MIN/1.73M*2
EOSINOPHIL # BLD AUTO: 0.09 X10*3/UL (ref 0–0.4)
EOSINOPHIL NFR BLD AUTO: 2.2 %
ERYTHROCYTE [DISTWIDTH] IN BLOOD BY AUTOMATED COUNT: 12.7 % (ref 11.5–14.5)
EST. AVERAGE GLUCOSE BLD GHB EST-MCNC: 88 MG/DL
GLUCOSE SERPL-MCNC: 77 MG/DL (ref 74–99)
GLUCOSE UR STRIP.AUTO-MCNC: NORMAL MG/DL
HBA1C MFR BLD: 4.7 %
HCT VFR BLD AUTO: 39.7 % (ref 41–52)
HDLC SERPL-MCNC: 46.9 MG/DL
HGB BLD-MCNC: 13.8 G/DL (ref 13.5–17.5)
IMM GRANULOCYTES # BLD AUTO: 0.01 X10*3/UL (ref 0–0.5)
IMM GRANULOCYTES NFR BLD AUTO: 0.2 % (ref 0–0.9)
KETONES UR STRIP.AUTO-MCNC: NEGATIVE MG/DL
LDLC SERPL CALC-MCNC: 44 MG/DL
LEUKOCYTE ESTERASE UR QL STRIP.AUTO: NEGATIVE
LYMPHOCYTES # BLD AUTO: 0.72 X10*3/UL (ref 0.8–3)
LYMPHOCYTES NFR BLD AUTO: 17.2 %
MCH RBC QN AUTO: 36.4 PG (ref 26–34)
MCHC RBC AUTO-ENTMCNC: 34.8 G/DL (ref 32–36)
MCV RBC AUTO: 105 FL (ref 80–100)
MICROALBUMIN UR-MCNC: 18.6 MG/L
MICROALBUMIN/CREAT UR: 69.7 UG/MG CREAT
MONOCYTES # BLD AUTO: 0.47 X10*3/UL (ref 0.05–0.8)
MONOCYTES NFR BLD AUTO: 11.2 %
NEUTROPHILS # BLD AUTO: 2.86 X10*3/UL (ref 1.6–5.5)
NEUTROPHILS NFR BLD AUTO: 68.5 %
NITRITE UR QL STRIP.AUTO: NEGATIVE
NON HDL CHOLESTEROL: 59 MG/DL (ref 0–149)
NRBC BLD-RTO: 0 /100 WBCS (ref 0–0)
PH UR STRIP.AUTO: 8 [PH]
PLATELET # BLD AUTO: 168 X10*3/UL (ref 150–450)
POTASSIUM SERPL-SCNC: 4.4 MMOL/L (ref 3.5–5.3)
PROT SERPL-MCNC: 6.1 G/DL (ref 6.4–8.2)
PROT UR STRIP.AUTO-MCNC: NEGATIVE MG/DL
RBC # BLD AUTO: 3.79 X10*6/UL (ref 4.5–5.9)
RBC # UR STRIP.AUTO: ABNORMAL /UL
RBC #/AREA URNS AUTO: NORMAL /HPF
SODIUM SERPL-SCNC: 132 MMOL/L (ref 136–145)
SP GR UR STRIP.AUTO: 1.01
TRIGL SERPL-MCNC: 77 MG/DL (ref 0–149)
URATE SERPL-MCNC: 3.1 MG/DL (ref 4–7.5)
UROBILINOGEN UR STRIP.AUTO-MCNC: NORMAL MG/DL
VLDL: 15 MG/DL (ref 0–40)
WBC # BLD AUTO: 4.2 X10*3/UL (ref 4.4–11.3)
WBC #/AREA URNS AUTO: NORMAL /HPF

## 2024-09-24 PROCEDURE — 82306 VITAMIN D 25 HYDROXY: CPT

## 2024-09-24 PROCEDURE — 83036 HEMOGLOBIN GLYCOSYLATED A1C: CPT

## 2024-09-24 PROCEDURE — 82570 ASSAY OF URINE CREATININE: CPT

## 2024-09-24 PROCEDURE — 80053 COMPREHEN METABOLIC PANEL: CPT

## 2024-09-24 PROCEDURE — 36415 COLL VENOUS BLD VENIPUNCTURE: CPT

## 2024-09-24 PROCEDURE — 81001 URINALYSIS AUTO W/SCOPE: CPT

## 2024-09-24 PROCEDURE — 85025 COMPLETE CBC W/AUTO DIFF WBC: CPT

## 2024-09-24 PROCEDURE — 80061 LIPID PANEL: CPT

## 2024-09-24 PROCEDURE — 84550 ASSAY OF BLOOD/URIC ACID: CPT

## 2024-09-24 PROCEDURE — 82043 UR ALBUMIN QUANTITATIVE: CPT

## 2024-09-25 ENCOUNTER — HOME CARE VISIT (OUTPATIENT)
Dept: HOME HEALTH SERVICES | Facility: HOME HEALTH | Age: 89
End: 2024-09-25
Payer: MEDICARE

## 2024-09-25 VITALS
OXYGEN SATURATION: 5 % | TEMPERATURE: 98.3 F | RESPIRATION RATE: 22 BRPM | DIASTOLIC BLOOD PRESSURE: 80 MMHG | HEART RATE: 115 BPM | SYSTOLIC BLOOD PRESSURE: 157 MMHG

## 2024-09-25 PROCEDURE — G0151 HHCP-SERV OF PT,EA 15 MIN: HCPCS | Mod: HHH

## 2024-09-25 ASSESSMENT — ENCOUNTER SYMPTOMS
OCCASIONAL FEELINGS OF UNSTEADINESS: 0
PERSON REPORTING PAIN: PATIENT
DENIES PAIN: 1

## 2024-09-25 ASSESSMENT — ACTIVITIES OF DAILY LIVING (ADL)
OASIS_M1830: 00
HOME_HEALTH_OASIS: 00
AMBULATION ASSISTANCE ON UNEVEN SURFACES: 1
AMBULATION ASSISTANCE ON FLAT SURFACES: 1

## 2024-10-01 ENCOUNTER — APPOINTMENT (OUTPATIENT)
Dept: PRIMARY CARE | Facility: CLINIC | Age: 89
End: 2024-10-01
Payer: MEDICARE

## 2024-10-01 VITALS
HEART RATE: 50 BPM | DIASTOLIC BLOOD PRESSURE: 80 MMHG | BODY MASS INDEX: 23.39 KG/M2 | SYSTOLIC BLOOD PRESSURE: 140 MMHG | WEIGHT: 132 LBS | OXYGEN SATURATION: 97 % | HEIGHT: 63 IN

## 2024-10-01 DIAGNOSIS — I10 ESSENTIAL HYPERTENSION: ICD-10-CM

## 2024-10-01 DIAGNOSIS — Z00.00 WELL ADULT EXAM: Primary | ICD-10-CM

## 2024-10-01 DIAGNOSIS — R26.81 GAIT INSTABILITY: ICD-10-CM

## 2024-10-01 DIAGNOSIS — K21.9 GASTROESOPHAGEAL REFLUX DISEASE WITHOUT ESOPHAGITIS: ICD-10-CM

## 2024-10-01 DIAGNOSIS — I48.0 PAROXYSMAL ATRIAL FIBRILLATION (MULTI): ICD-10-CM

## 2024-10-01 PROBLEM — R30.0 DYSURIA: Status: RESOLVED | Noted: 2022-10-05 | Resolved: 2024-10-01

## 2024-10-01 PROBLEM — I44.1 MOBITZ I: Status: RESOLVED | Noted: 2023-12-28 | Resolved: 2024-10-01

## 2024-10-01 PROBLEM — S09.90XA INJURY OF HEAD: Status: RESOLVED | Noted: 2024-10-01 | Resolved: 2024-10-01

## 2024-10-01 PROBLEM — I71.9 AORTIC ANEURYSM (CMS-HCC): Status: ACTIVE | Noted: 2020-12-11

## 2024-10-01 PROBLEM — S80.219A ABRASION OF KNEE: Status: RESOLVED | Noted: 2023-08-10 | Resolved: 2024-10-01

## 2024-10-01 PROBLEM — R42 DIZZY SPELLS: Status: RESOLVED | Noted: 2023-12-27 | Resolved: 2024-10-01

## 2024-10-01 PROBLEM — M54.50 ACUTE LOW BACK PAIN: Status: RESOLVED | Noted: 2024-10-01 | Resolved: 2024-10-01

## 2024-10-01 PROBLEM — M25.519 SHOULDER PAIN: Status: ACTIVE | Noted: 2024-10-01

## 2024-10-01 PROBLEM — N39.0 URINARY TRACT INFECTION WITHOUT HEMATURIA: Status: RESOLVED | Noted: 2024-01-17 | Resolved: 2024-10-01

## 2024-10-01 PROBLEM — K92.2 GASTROINTESTINAL HEMORRHAGE: Status: RESOLVED | Noted: 2020-12-11 | Resolved: 2024-10-01

## 2024-10-01 PROCEDURE — 1159F MED LIST DOCD IN RCRD: CPT | Performed by: FAMILY MEDICINE

## 2024-10-01 PROCEDURE — 1170F FXNL STATUS ASSESSED: CPT | Performed by: FAMILY MEDICINE

## 2024-10-01 PROCEDURE — 3079F DIAST BP 80-89 MM HG: CPT | Performed by: FAMILY MEDICINE

## 2024-10-01 PROCEDURE — G0439 PPPS, SUBSEQ VISIT: HCPCS | Performed by: FAMILY MEDICINE

## 2024-10-01 PROCEDURE — 1126F AMNT PAIN NOTED NONE PRSNT: CPT | Performed by: FAMILY MEDICINE

## 2024-10-01 PROCEDURE — 1123F ACP DISCUSS/DSCN MKR DOCD: CPT | Performed by: FAMILY MEDICINE

## 2024-10-01 PROCEDURE — 99397 PER PM REEVAL EST PAT 65+ YR: CPT | Performed by: FAMILY MEDICINE

## 2024-10-01 PROCEDURE — 3077F SYST BP >= 140 MM HG: CPT | Performed by: FAMILY MEDICINE

## 2024-10-01 PROCEDURE — 1160F RVW MEDS BY RX/DR IN RCRD: CPT | Performed by: FAMILY MEDICINE

## 2024-10-01 PROCEDURE — 1158F ADVNC CARE PLAN TLK DOCD: CPT | Performed by: FAMILY MEDICINE

## 2024-10-01 ASSESSMENT — ACTIVITIES OF DAILY LIVING (ADL)
MANAGING_FINANCES: TOTAL CARE
DRESSING: INDEPENDENT
DOING_HOUSEWORK: INDEPENDENT
BATHING: INDEPENDENT
GROCERY_SHOPPING: TOTAL CARE
TAKING_MEDICATION: INDEPENDENT

## 2024-10-01 ASSESSMENT — PATIENT HEALTH QUESTIONNAIRE - PHQ9
2. FEELING DOWN, DEPRESSED OR HOPELESS: NOT AT ALL
1. LITTLE INTEREST OR PLEASURE IN DOING THINGS: NOT AT ALL
SUM OF ALL RESPONSES TO PHQ9 QUESTIONS 1 AND 2: 0

## 2024-10-01 ASSESSMENT — COLUMBIA-SUICIDE SEVERITY RATING SCALE - C-SSRS: 1. IN THE PAST MONTH, HAVE YOU WISHED YOU WERE DEAD OR WISHED YOU COULD GO TO SLEEP AND NOT WAKE UP?: NO

## 2024-10-01 ASSESSMENT — PAIN SCALES - GENERAL: PAINLEVEL: 0-NO PAIN

## 2024-10-01 NOTE — PROGRESS NOTES
"Subjective   Reason for Visit: Kavon Bowers is an 89 y.o. male here for a Medicare Wellness visit.     Past Medical, Surgical, and Family History reviewed and updated in chart.    Reviewed all medications by prescribing practitioner or clinical pharmacist (such as prescriptions, OTCs, herbal therapies and supplements) and documented in the medical record.    HPI    Patient Care Team:  Katherine Dasilva MD as PCP - General (Family Medicine)  Katherine Dasilva MD as PCP - [employer]age PCP  Eliezer Al MD as Consulting Physician (Rheumatology)  Bethanie Mendoza MD as Referring Physician (Cardiology)  Leif Cox MD as Referring Physician (Gastroenterology)   Kavon Bowers is seen for comprehensive physical exam.  PMH, PSH, family history and social history were reviewed and updated.  Atrial fibrillation -stable.  Seeing Dr. Mendoza.  Hypertension -no chest pain or palpitations  GERD -no recent flares  Recurrent UTI -no recent issues with prophylactic therapy.    Review of Systems   Constitutional:  Negative for chills, fever and unexpected weight change.   HENT:  Negative for congestion, hearing loss, postnasal drip, sore throat and trouble swallowing.    Eyes:  Negative for visual disturbance.   Respiratory:  Negative for cough and shortness of breath.    Cardiovascular:  Negative for chest pain and leg swelling.   Gastrointestinal:  Negative for abdominal pain, blood in stool, nausea and vomiting.   Genitourinary:  Negative for difficulty urinating and hematuria.   Musculoskeletal:  Negative for arthralgias and myalgias.   Skin:  Negative for rash.   Neurological:  Negative for weakness and numbness.   Psychiatric/Behavioral:  Negative for dysphoric mood. The patient is not nervous/anxious.    All other systems reviewed and are negative.      Objective   Vitals:  /80   Pulse 50   Ht 1.6 m (5' 3\")   Wt 59.9 kg (132 lb)   SpO2 97%   BMI 23.38 kg/m²       Physical Exam  Vitals and nursing note " reviewed.   Constitutional:       Appearance: Normal appearance.   HENT:      Head: Normocephalic.      Right Ear: Tympanic membrane and ear canal normal.      Left Ear: Tympanic membrane and ear canal normal.      Nose: Nose normal.      Mouth/Throat:      Mouth: Mucous membranes are moist.   Eyes:      Extraocular Movements: Extraocular movements intact.      Pupils: Pupils are equal, round, and reactive to light.   Neck:      Vascular: No carotid bruit.   Cardiovascular:      Rate and Rhythm: Normal rate and regular rhythm.   Pulmonary:      Effort: Pulmonary effort is normal.      Breath sounds: Normal breath sounds.   Abdominal:      General: Abdomen is flat. Bowel sounds are normal.      Palpations: Abdomen is soft.      Tenderness: There is no abdominal tenderness.   Musculoskeletal:         General: Normal range of motion.      Cervical back: Normal range of motion.   Lymphadenopathy:      Cervical: No cervical adenopathy.   Skin:     General: Skin is warm.      Findings: No rash.   Neurological:      General: No focal deficit present.      Mental Status: He is alert.   Psychiatric:         Mood and Affect: Mood normal.         Assessment/Plan   Assessment & Plan  Well adult exam  Preventative measures discussed in detail.  Immunizations reviewed and discussed.  Reviewed labs with patient.         Gait instability  Continue with exercise at home.  Cane ordered for stability.  Orders:    miscellaneous medical supply misc; Cane to be used daily with ambulation for stability    Essential hypertension  Controlled.  Continue current metoprolol as prescribed.  DASH diet. Exercise at least 4 times per week.          Paroxysmal atrial fibrillation (Multi)  Monitor for recurrence of symptoms       Gastroesophageal reflux disease without esophagitis  Continue Protonix daily.  Avoid dietary triggers.           Follow up 6 MONTHS, sooner with any problems or concerns.

## 2024-10-06 PROBLEM — M25.519 SHOULDER PAIN: Status: RESOLVED | Noted: 2024-10-01 | Resolved: 2024-10-06

## 2024-10-06 ASSESSMENT — ENCOUNTER SYMPTOMS
DIFFICULTY URINATING: 0
ARTHRALGIAS: 0
MYALGIAS: 0
WEAKNESS: 0
TROUBLE SWALLOWING: 0
SHORTNESS OF BREATH: 0
NERVOUS/ANXIOUS: 0
DYSPHORIC MOOD: 0
SORE THROAT: 0
HEMATURIA: 0
FEVER: 0
BLOOD IN STOOL: 0
COUGH: 0
VOMITING: 0
ABDOMINAL PAIN: 0
NAUSEA: 0
NUMBNESS: 0
UNEXPECTED WEIGHT CHANGE: 0
CHILLS: 0

## 2024-10-07 ENCOUNTER — TELEPHONE (OUTPATIENT)
Dept: PRIMARY CARE | Facility: CLINIC | Age: 89
End: 2024-10-07
Payer: MEDICARE

## 2024-10-07 DIAGNOSIS — N39.0 URINARY TRACT INFECTION WITHOUT HEMATURIA, SITE UNSPECIFIED: ICD-10-CM

## 2024-10-07 RX ORDER — CEPHALEXIN 500 MG/1
500 CAPSULE ORAL DAILY
Qty: 90 CAPSULE | Refills: 1 | Status: SHIPPED | OUTPATIENT
Start: 2024-10-07 | End: 2025-04-05

## 2024-10-07 NOTE — TELEPHONE ENCOUNTER
[facility] Refill Request Telephone Encounter    Name:  Kavon Bowers  :  [id number]  Medication Name:  Cephalexin (for UTI's has 2 pills left DDC wants him to stay on it )            Specific Pharmacy location:  [facility]   Date of last appointment:    Date of next appointment:    Best number to reach patient:

## 2024-10-07 NOTE — ASSESSMENT & PLAN NOTE
Controlled.  Continue current metoprolol as prescribed.  DASH diet. Exercise at least 4 times per week.

## 2024-10-17 ENCOUNTER — HOSPITAL ENCOUNTER (EMERGENCY)
Facility: HOSPITAL | Age: 89
Discharge: HOME | End: 2024-10-17
Attending: EMERGENCY MEDICINE
Payer: MEDICARE

## 2024-10-17 ENCOUNTER — APPOINTMENT (OUTPATIENT)
Dept: RADIOLOGY | Facility: HOSPITAL | Age: 89
End: 2024-10-17
Payer: MEDICARE

## 2024-10-17 VITALS
SYSTOLIC BLOOD PRESSURE: 145 MMHG | TEMPERATURE: 97.9 F | BODY MASS INDEX: 23.64 KG/M2 | DIASTOLIC BLOOD PRESSURE: 99 MMHG | RESPIRATION RATE: 20 BRPM | OXYGEN SATURATION: 96 % | WEIGHT: 138.45 LBS | HEIGHT: 64 IN | HEART RATE: 104 BPM

## 2024-10-17 DIAGNOSIS — S09.90XA CLOSED HEAD INJURY, INITIAL ENCOUNTER: ICD-10-CM
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
## 2024-10-17 PROCEDURE — 99285 EMERGENCY DEPT VISIT HI MDM: CPT | Mod: 25

## 2024-10-17 PROCEDURE — 70450 CT HEAD/BRAIN W/O DYE: CPT | Performed by: RADIOLOGY

## 2024-10-17 PROCEDURE — 70450 CT HEAD/BRAIN W/O DYE: CPT

## 2024-10-17 PROCEDURE — 72125 CT NECK SPINE W/O DYE: CPT

## 2024-10-17 PROCEDURE — 72125 CT NECK SPINE W/O DYE: CPT | Performed by: RADIOLOGY

## 2024-10-17 ASSESSMENT — LIFESTYLE VARIABLES
HAVE YOU EVER FELT YOU SHOULD CUT DOWN ON YOUR DRINKING: NO
EVER FELT BAD OR GUILTY ABOUT YOUR DRINKING: NO
HAVE PEOPLE ANNOYED YOU BY CRITICIZING YOUR DRINKING: NO
EVER HAD A DRINK FIRST THING IN THE MORNING TO STEADY YOUR NERVES TO GET RID OF A HANGOVER: NO
TOTAL SCORE: 0

## 2024-10-17 ASSESSMENT — COLUMBIA-SUICIDE SEVERITY RATING SCALE - C-SSRS
6. HAVE YOU EVER DONE ANYTHING, STARTED TO DO ANYTHING, OR PREPARED TO DO ANYTHING TO END YOUR LIFE?: NO
1. IN THE PAST MONTH, HAVE YOU WISHED YOU WERE DEAD OR WISHED YOU COULD GO TO SLEEP AND NOT WAKE UP?: NO
2. HAVE YOU ACTUALLY HAD ANY THOUGHTS OF KILLING YOURSELF?: NO

## 2024-10-17 NOTE — ED TRIAGE NOTES
PATIENT BIB EMS FOR FALL, HE IS ON ELIQUIS FOR AFIB WHICH HE IS IN, NO HEAD INJURY, NO LOC, JUST A LOSS OF BALANCE.

## 2024-10-17 NOTE — ED PROVIDER NOTES
HPI   Chief Complaint   Patient presents with    Fall     PATIENT BIB EMS FOR FALL, HE IS ON ELIQUIS FOR AFIB WHICH HE IS IN, NO HEAD INJURY, NO LOC, JUST A LOSS OF BALANCE.       HPI    Patient is a 89-year-old male with a history of atrial fibrillation currently on Eliquis presenting for evaluation after a fall.  Patient states that he was bending over to turn off a spigot when he fell backwards downwards on his butt and had difficulty getting up.  911 was called, and son is a nurse and power of  and requested that the patient come for evaluation.  Patient denies any injury, including no head injury or neck injury.  He denies striking his head or losing consciousness.  He denies head or neck pain.  No chest pain.  No hip pain.  No shortness of breath.  No abdominal pain.  Patient does admit to some back pain but states that it is chronic and unchanged from his normal.  No bowel or bladder incontinence.  No dizziness or lightheadedness.    Patient History   Past Medical History:   Diagnosis Date    Abrasion of knee 08/10/2023    Acute low back pain 10/01/2024    Dizzy spells 2023    Dysuria 10/05/2022    Gastrointestinal hemorrhage 2020    Injury of head 10/01/2024    Mobitz I 2023    Urinary tract infection without hematuria 2024     Past Surgical History:   Procedure Laterality Date    CARDIOVERSION  2024     Family History   Problem Relation Name Age of Onset    Diabetes type II Sister      Other (htn) Brother      Heart disease Brother      Hyperlipidemia Brother       Social History     Tobacco Use    Smoking status: Former     Current packs/day: 0.00     Types: Cigarettes     Start date: 1946     Quit date: 1951     Years since quittin.8    Smokeless tobacco: Never   Vaping Use    Vaping status: Never Used   Substance Use Topics    Alcohol use: Not Currently    Drug use: Never       Physical Exam   ED Triage Vitals [10/17/24 1739]   Temperature Heart Rate  Nutrition Care Plan    Nutrition Diagnosis:   Altered nutrition-related laboratory values  related to uncontrolled DM as evidenced by blood sugar range of 364-164 and A1c 9.9.    Intervention:  Modified diet:    Continue Consistent CHO Moderate diet.  Medications:  Adjust insulin as needed.   Nutrition relationship to health/disease:   Nutrition education related to uncontrolled DM based on pt current level of knowledge.    Monitoring and Evaluation:  Glucose, casual:    HgbA1c:  Goal: blood sugars and A1c to be WNL  Area(s) and level of knowledge:   Goal: Assess pt level of knowledge.  Pt was too sleepy today for this assessment.  Will follow up at next visit.        Respirations BP   36.6 °C (97.9 °F) 94 20 (!) 134/99      Pulse Ox Temp src Heart Rate Source Patient Position   96 % -- -- --      BP Location FiO2 (%)     -- --       Physical Exam  Vitals and nursing note reviewed.   Constitutional:       Appearance: Normal appearance.   HENT:      Head: Normocephalic and atraumatic.   Eyes:      Extraocular Movements: Extraocular movements intact.      Conjunctiva/sclera: Conjunctivae normal.      Pupils: Pupils are equal, round, and reactive to light.   Neck:      Comments: No C-spine tenderness, palpable bony deformities or step-offs.  Cardiovascular:      Rate and Rhythm: Normal rate and regular rhythm.   Pulmonary:      Effort: Pulmonary effort is normal.      Breath sounds: Normal breath sounds.   Abdominal:      General: Abdomen is flat. Bowel sounds are normal.      Palpations: Abdomen is soft.   Musculoskeletal:         General: Normal range of motion.      Cervical back: Normal range of motion and neck supple.   Neurological:      Mental Status: He is alert.           ED Course & MDM   Diagnoses as of 10/17/24 1812   Fall, initial encounter   Closed head injury, initial encounter                 No data recorded     Viola Coma Scale Score: 15 (10/17/24 1739 : Lou Maravilla RN)                           Medical Decision Making  Parts of this chart have been completed using voice recognition software. Please excuse any errors of transcription. Despite the medical decision making time stamp above-my medical decision making has taken place during the patient's entire visit. My thought process and reason for plan has been formulated from the time that I saw the patient until the time of disposition and is not specific to one specific moment during their visit and furthermore my MDM encompasses this entire chart and not only this text box.      HPI: Detailed above.    Exam: A medically appropriate exam performed, outlined above, given the known history and  presentation.    History obtained from: Patient    Social Determinants of Health considered during this visit: Coming from home    EKG interpreted by my attending physician, reviewed by myself.    CT head wo IV contrast   Final Result   No evidence of acute cortical infarct or intracranial hemorrhage.        MACRO:   None        Signed by: Dougie Romero 10/17/2024 6:02 PM   Dictation workstation:   [id number]      CT cervical spine wo IV contrast   Final Result   No evidence for an acute fracture or subluxation of the cervical   spine.        MACRO:   None        Signed by: Dougie Romero 10/17/2024 6:05 PM   Dictation workstation:   [id number]        Differential diagnoses considered for this visit include: Acute intracranial processes versus cervical spine instability    Considerations/further MDM:    Patient is a 89-year-old male presenting for evaluation after sustaining a mechanical fall.  Patient was leaning over to turn off his bike it when he fell backwards and was unable to get back up.  He denies striking his head or losing consciousness.  Given that the patient is on Eliquis and with his mechanism of injury, CT head without contrast and CT cervical spine without contrast were ordered for diagnostic imaging. CT head showed no evidence of acute cortical infarct or intracranial hemorrhage.  CT cervical spine showed no evidence for acute fracture or subluxation.  I estimate there is LOW risk for severe head injury requiring admission or transfer to another facility. I have considered: FRACTURE, DISLOCATION, FACIAL FRACTURE, OCULAR INVOLEMENT, CAUDA EQUINA or CENTRAL CORD SYNDROME, EPIDURAL MASS LESION, MENINGITIS, SPINAL STENOSIS, OR HERNIATED DISK CAUSING SEVERE STENOSIS SUBARACHNOID HEMORRHAGE, CAVERNOUS VENOUS THROMBOSIS, MENINGITIS, INTRACRANIAL HEMORRHAGE, SUBDURAL HEMATOMA, OR STROKE/TIA, CONCUSSION. thus I consider the discharge disposition reasonable. We have discussed the diagnosis and risks, and  we agree with discharging home to follow-up with their primary doctor, or specialist as provided. We also discussed returning to the Emergency Department immediately if new or worsening symptoms occur. We have discussed the symptoms which are most concerning (e.g., changing or worsening pain, weakness, vomiting, fever) that necessitate immediate return.    Patient was seen in conjunction with attending physician Dr. Brianda Shukla.   Patient's history, physical exam, diagnostic studies, and treatment plan were discussed thoroughly.    Procedure  Procedures     Sharon Tsai PA-C  10/17/24 181

## 2024-10-17 NOTE — DISCHARGE INSTRUCTIONS
Thank you for choosing [facility] for your healthcare needs today estimation point there were no acute findings on your diagnostic imaging today.  Return to the emergency department should you suffer any more falls and would like to have an evaluation.  Follow-up with your primary care doctor as recommended after today's visit.  Please call and schedule an appointment with them as soon as you are able to.

## 2024-10-21 DIAGNOSIS — K21.9 GASTROESOPHAGEAL REFLUX DISEASE WITHOUT ESOPHAGITIS: Primary | ICD-10-CM

## 2024-10-21 RX ORDER — PANTOPRAZOLE SODIUM 40 MG/1
40 TABLET, DELAYED RELEASE ORAL DAILY
Qty: 90 TABLET | Refills: 3 | Status: SHIPPED | OUTPATIENT
Start: 2024-10-21

## 2024-10-22 ENCOUNTER — APPOINTMENT (OUTPATIENT)
Dept: PRIMARY CARE | Facility: CLINIC | Age: 89
End: 2024-10-22
Payer: MEDICARE

## 2024-10-22 VITALS
HEART RATE: 112 BPM | WEIGHT: 134 LBS | SYSTOLIC BLOOD PRESSURE: 110 MMHG | HEIGHT: 64 IN | DIASTOLIC BLOOD PRESSURE: 78 MMHG | BODY MASS INDEX: 22.88 KG/M2 | OXYGEN SATURATION: 96 %

## 2024-10-22 DIAGNOSIS — M51.372 DEGENERATION OF INTERVERTEBRAL DISC OF LUMBOSACRAL REGION WITH DISCOGENIC BACK PAIN AND LOWER EXTREMITY PAIN: Primary | ICD-10-CM

## 2024-10-22 PROCEDURE — 1036F TOBACCO NON-USER: CPT | Performed by: FAMILY MEDICINE

## 2024-10-22 PROCEDURE — 99213 OFFICE O/P EST LOW 20 MIN: CPT | Performed by: FAMILY MEDICINE

## 2024-10-22 PROCEDURE — 3078F DIAST BP <80 MM HG: CPT | Performed by: FAMILY MEDICINE

## 2024-10-22 PROCEDURE — 1123F ACP DISCUSS/DSCN MKR DOCD: CPT | Performed by: FAMILY MEDICINE

## 2024-10-22 PROCEDURE — 1159F MED LIST DOCD IN RCRD: CPT | Performed by: FAMILY MEDICINE

## 2024-10-22 PROCEDURE — 1125F AMNT PAIN NOTED PAIN PRSNT: CPT | Performed by: FAMILY MEDICINE

## 2024-10-22 PROCEDURE — 3074F SYST BP LT 130 MM HG: CPT | Performed by: FAMILY MEDICINE

## 2024-10-22 PROCEDURE — 1158F ADVNC CARE PLAN TLK DOCD: CPT | Performed by: FAMILY MEDICINE

## 2024-10-22 PROCEDURE — 1160F RVW MEDS BY RX/DR IN RCRD: CPT | Performed by: FAMILY MEDICINE

## 2024-10-22 ASSESSMENT — COLUMBIA-SUICIDE SEVERITY RATING SCALE - C-SSRS: 1. IN THE PAST MONTH, HAVE YOU WISHED YOU WERE DEAD OR WISHED YOU COULD GO TO SLEEP AND NOT WAKE UP?: NO

## 2024-10-22 ASSESSMENT — PAIN SCALES - GENERAL: PAINLEVEL_OUTOF10: 3

## 2024-10-23 NOTE — PROGRESS NOTES
"Subjective   Patient ID: Kavon Bowers is a 89 y.o. male who presents for Follow-up (Patient is in office today for a ER follow-up for a fall ).    HPI  Kavon presents for follow-up of the emergency room.  He was squatting down to turn off the faucet and lost his balance falling backward onto his buttocks.  No loss of consciousness.  Full evaluation done.  Sent home.  Still feels a little woozy at times.  Using the walker.  No pain outside of his usual back pain.  He would like to consider pain management.    Review of Systems  All other systems have been reviewed and are negative except as noted in the HPI.       Objective   Vital Signs:  /78   Pulse (!) 112   Ht 1.626 m (5' 4\")   Wt 60.8 kg (134 lb)   SpO2 96%   BMI 23.00 kg/m²     Physical Exam  Alert.  No acute distress.  Gait stable with walker.  Assessment & Plan  Degeneration of intervertebral disc of lumbosacral region with discogenic back pain and lower extremity pain  Heat to the area as needed.  Tylenol if needed.  Recommend referral to pain management to help with possible injection to decrease his chronic pain.  Orders:    Referral to Pain Medicine; Future        Follow up PRN, sooner with any problems or concerns.  "

## 2024-10-28 DIAGNOSIS — M17.0 PRIMARY OSTEOARTHRITIS OF BOTH KNEES: Primary | ICD-10-CM

## 2024-10-28 DIAGNOSIS — R26.81 GAIT INSTABILITY: ICD-10-CM

## 2024-11-09 DIAGNOSIS — M10.9 GOUT, UNSPECIFIED CAUSE, UNSPECIFIED CHRONICITY, UNSPECIFIED SITE: ICD-10-CM

## 2024-11-10 RX ORDER — ALLOPURINOL 300 MG/1
300 TABLET ORAL DAILY
Qty: 90 TABLET | Refills: 3 | Status: SHIPPED | OUTPATIENT
Start: 2024-11-10

## 2024-12-06 ENCOUNTER — NURSING HOME VISIT (OUTPATIENT)
Dept: POST ACUTE CARE | Facility: EXTERNAL LOCATION | Age: 89
End: 2024-12-06
Payer: MEDICARE

## 2024-12-06 DIAGNOSIS — I11.0 HYPERTENSIVE HEART DISEASE WITH HEART FAILURE: ICD-10-CM

## 2024-12-06 DIAGNOSIS — E87.1 HYPONATREMIA: ICD-10-CM

## 2024-12-06 DIAGNOSIS — I10 ESSENTIAL HYPERTENSION: Primary | ICD-10-CM

## 2024-12-06 DIAGNOSIS — I48.0 PAROXYSMAL ATRIAL FIBRILLATION (MULTI): ICD-10-CM

## 2024-12-06 DIAGNOSIS — K21.9 GASTROESOPHAGEAL REFLUX DISEASE WITHOUT ESOPHAGITIS: ICD-10-CM

## 2024-12-06 PROCEDURE — 99306 1ST NF CARE HIGH MDM 50: CPT | Performed by: FAMILY MEDICINE

## 2024-12-06 NOTE — LETTER
Patient: Kavon Bowers  : 1933    Encounter Date: 2024    Subjective  Patient ID: Kavon Bowers is a 89 y.o. male who is acute skilled care being seen and evaluated for multiple medical problems.    HPI Pt presented to the hospital with concerns of nausea and vomiting. Lab work revealed urinary tract infection and severe symptomatic hyponatremia. IV antibiotics ordered for UTI. CT imaging showed moderate colonic stool burden and moderate rectal stool burden - bowel regimen added. GI was consulted and enema given. There was a concern for rectal bleeding, however this improved and eliquis was restarted without signs of bleeding. Neurology, nephrology and cardio consulted.        Dizziness- neuro felt central vertigo, UTI and afib  Nephrology- dizziness related to hyponatremia- SIADH vs hypovolemia   Geriatrics- stopped baclofen and meclizine, Seroquel nighttime and PRN for - delirium   ID- UTI- treated with cefepime, transitioned to zosyn, completed course  Speech- regular, nectar thick, medications whole in applesauce   Cardiology- afib RVR- loaded with dig, increased metoprolol 25mg XL on discharge  GI- rectal bleed- - constipation, cleared out via enema, restarted eliquis, no further bleeding     He is here for PT and OT.  He has labs pending today including CBC and CMP.    Review of Systems   Constitutional:  Positive for fatigue. Negative for fever.   HENT:  Negative for congestion and sore throat.    Eyes:  Negative for visual disturbance.   Respiratory:  Negative for cough, chest tightness and shortness of breath.    Cardiovascular:  Negative for chest pain, palpitations and leg swelling.   Gastrointestinal:  Negative for abdominal pain, constipation, diarrhea, nausea and vomiting.   Genitourinary:  Negative for dysuria, frequency and urgency.   Musculoskeletal:  Positive for arthralgias and gait problem.   Skin:  Negative for rash.   Neurological:  Positive for dizziness and weakness. Negative for  tremors, syncope and headaches.   Psychiatric/Behavioral:  Negative for dysphoric mood. The patient is not nervous/anxious.        Objective  There were no vitals taken for this visit.    Physical Exam  Vitals reviewed.   Constitutional:       General: He is not in acute distress.     Appearance: Normal appearance.   HENT:      Head: Atraumatic.      Nose: Nose normal.      Mouth/Throat:      Mouth: Mucous membranes are dry.      Pharynx: Oropharynx is clear.   Eyes:      General: No scleral icterus.     Conjunctiva/sclera: Conjunctivae normal.   Cardiovascular:      Rate and Rhythm: Normal rate. Rhythm irregular.      Heart sounds:      No gallop.   Pulmonary:      Effort: Pulmonary effort is normal.      Breath sounds: Normal breath sounds. No wheezing.   Abdominal:      General: There is no distension.      Palpations: There is no mass.      Tenderness: There is no abdominal tenderness. There is no rebound.   Musculoskeletal:      Cervical back: Normal range of motion.   Lymphadenopathy:      Cervical: No cervical adenopathy.   Skin:     General: Skin is warm.      Coloration: Skin is not jaundiced.   Neurological:      Mental Status: Mental status is at baseline.      Motor: Weakness present.      Gait: Gait abnormal.         Assessment/Plan  Problem List Items Addressed This Visit             ICD-10-CM    Atrial fibrillation (Multi) I48.91    Essential hypertension - Primary I10    Hyponatremia E87.1    Hypertensive heart disease with heart failure I11.0    Gastroesophageal reflux disease without esophagitis K21.9     CBC CMP pending, continue with higher dose metoprolol, heart rate controlled here.  With cardiology.  Dizziness overall is improving.  Infection cleared with use of antibiotics.  No increase in behaviors noted.   Goals    None           Electronically Signed By: Neida Cabello MD   12/9/24  1:25 PM

## 2024-12-09 ASSESSMENT — ENCOUNTER SYMPTOMS
SHORTNESS OF BREATH: 0
HEADACHES: 0
COUGH: 0
DIZZINESS: 1
SORE THROAT: 0
TREMORS: 0
ABDOMINAL PAIN: 0
VOMITING: 0
PALPITATIONS: 0
FATIGUE: 1
FEVER: 0
FREQUENCY: 0
NERVOUS/ANXIOUS: 0
DYSPHORIC MOOD: 0
DIARRHEA: 0
CHEST TIGHTNESS: 0
DYSURIA: 0
WEAKNESS: 1
ARTHRALGIAS: 1
CONSTIPATION: 0
NAUSEA: 0

## 2024-12-09 NOTE — PROGRESS NOTES
Subjective   Patient ID: Kavon Bowers is a 89 y.o. male who is acute skilled care being seen and evaluated for multiple medical problems.    HPI Pt presented to the hospital with concerns of nausea and vomiting. Lab work revealed urinary tract infection and severe symptomatic hyponatremia. IV antibiotics ordered for UTI. CT imaging showed moderate colonic stool burden and moderate rectal stool burden - bowel regimen added. GI was consulted and enema given. There was a concern for rectal bleeding, however this improved and eliquis was restarted without signs of bleeding. Neurology, nephrology and cardio consulted.        Dizziness- neuro felt central vertigo, UTI and afib  Nephrology- dizziness related to hyponatremia- SIADH vs hypovolemia   Geriatrics- stopped baclofen and meclizine, Seroquel nighttime and PRN for - delirium   ID- UTI- treated with cefepime, transitioned to zosyn, completed course  Speech- regular, nectar thick, medications whole in applesauce   Cardiology- afib RVR- loaded with dig, increased metoprolol 25mg XL on discharge  GI- rectal bleed- - constipation, cleared out via enema, restarted eliquis, no further bleeding     He is here for PT and OT.  He has labs pending today including CBC and CMP.    Review of Systems   Constitutional:  Positive for fatigue. Negative for fever.   HENT:  Negative for congestion and sore throat.    Eyes:  Negative for visual disturbance.   Respiratory:  Negative for cough, chest tightness and shortness of breath.    Cardiovascular:  Negative for chest pain, palpitations and leg swelling.   Gastrointestinal:  Negative for abdominal pain, constipation, diarrhea, nausea and vomiting.   Genitourinary:  Negative for dysuria, frequency and urgency.   Musculoskeletal:  Positive for arthralgias and gait problem.   Skin:  Negative for rash.   Neurological:  Positive for dizziness and weakness. Negative for tremors, syncope and headaches.   Psychiatric/Behavioral:  Negative for  dysphoric mood. The patient is not nervous/anxious.        Objective   There were no vitals taken for this visit.    Physical Exam  Vitals reviewed.   Constitutional:       General: He is not in acute distress.     Appearance: Normal appearance.   HENT:      Head: Atraumatic.      Nose: Nose normal.      Mouth/Throat:      Mouth: Mucous membranes are dry.      Pharynx: Oropharynx is clear.   Eyes:      General: No scleral icterus.     Conjunctiva/sclera: Conjunctivae normal.   Cardiovascular:      Rate and Rhythm: Normal rate. Rhythm irregular.      Heart sounds:      No gallop.   Pulmonary:      Effort: Pulmonary effort is normal.      Breath sounds: Normal breath sounds. No wheezing.   Abdominal:      General: There is no distension.      Palpations: There is no mass.      Tenderness: There is no abdominal tenderness. There is no rebound.   Musculoskeletal:      Cervical back: Normal range of motion.   Lymphadenopathy:      Cervical: No cervical adenopathy.   Skin:     General: Skin is warm.      Coloration: Skin is not jaundiced.   Neurological:      Mental Status: Mental status is at baseline.      Motor: Weakness present.      Gait: Gait abnormal.         Assessment/Plan   Problem List Items Addressed This Visit             ICD-10-CM    Atrial fibrillation (Multi) I48.91    Essential hypertension - Primary I10    Hyponatremia E87.1    Hypertensive heart disease with heart failure I11.0    Gastroesophageal reflux disease without esophagitis K21.9     CBC CMP pending, continue with higher dose metoprolol, heart rate controlled here.  With cardiology.  Dizziness overall is improving.  Infection cleared with use of antibiotics.  No increase in behaviors noted.   Goals    None

## 2024-12-10 ENCOUNTER — NURSING HOME VISIT (OUTPATIENT)
Dept: POST ACUTE CARE | Facility: EXTERNAL LOCATION | Age: 89
End: 2024-12-10
Payer: MEDICARE

## 2024-12-10 ENCOUNTER — APPOINTMENT (OUTPATIENT)
Dept: PAIN MEDICINE | Facility: CLINIC | Age: 89
End: 2024-12-10
Payer: MEDICARE

## 2024-12-10 VITALS
BODY MASS INDEX: 24.63 KG/M2 | HEART RATE: 66 BPM | WEIGHT: 143.5 LBS | RESPIRATION RATE: 18 BRPM | TEMPERATURE: 98 F | DIASTOLIC BLOOD PRESSURE: 50 MMHG | OXYGEN SATURATION: 96 % | SYSTOLIC BLOOD PRESSURE: 118 MMHG

## 2024-12-10 DIAGNOSIS — R13.12 OROPHARYNGEAL DYSPHAGIA: ICD-10-CM

## 2024-12-10 DIAGNOSIS — R53.1 WEAKNESS: Primary | ICD-10-CM

## 2024-12-10 DIAGNOSIS — E87.1 HYPONATREMIA: ICD-10-CM

## 2024-12-10 PROCEDURE — 99309 SBSQ NF CARE MODERATE MDM 30: CPT | Performed by: NURSE PRACTITIONER

## 2024-12-10 RX ORDER — FERROUS SULFATE 325(65) MG
325 TABLET ORAL
COMMUNITY

## 2024-12-10 RX ORDER — VIT C/E/ZN/COPPR/LUTEIN/ZEAXAN 250MG-90MG
25 CAPSULE ORAL DAILY
COMMUNITY

## 2024-12-10 RX ORDER — QUETIAPINE FUMARATE 25 MG/1
12.5 TABLET, FILM COATED ORAL NIGHTLY
COMMUNITY

## 2024-12-10 ASSESSMENT — ENCOUNTER SYMPTOMS
CONFUSION: 1
ARTHRALGIAS: 1
TROUBLE SWALLOWING: 1

## 2024-12-10 NOTE — LETTER
Patient: Kavon Bowers  : 1933    Encounter Date: 12/10/2024    Subjective  Patient ID: Kavon Bowers is a 89 y.o. male who presents for Extremity Weakness.    Following up with patient who came to facility following hospitalization for weakness and confusion. He was treated with antibiotics for a UTI. He had a low sodium which was felt to be what contributed to his delirium. This was corrected and he came to facility for strengthening. He states that he feels good. He is sore and states that it is arthritis. He is on thickened liquids for dysphagia and will be seen by [facility] reviewed in [facility]. Labs were clotted from 24 and staff to have them redrawn tomorrow         Review of Systems   HENT:  Positive for trouble swallowing.    Musculoskeletal:  Positive for arthralgias and gait problem.   Psychiatric/Behavioral:  Positive for confusion.    All other systems reviewed and are negative.      Objective  /50   Pulse 66   Temp 36.7 °C (98 °F)   Resp 18   Wt 65.1 kg (143 lb 8 oz)   SpO2 96%   BMI 24.63 kg/m²     Physical Exam  Constitutional:       General: He is not in acute distress.     Appearance: He is not ill-appearing.   HENT:      Mouth/Throat:      Mouth: Mucous membranes are moist.   Eyes:      Conjunctiva/sclera: Conjunctivae normal.   Cardiovascular:      Rate and Rhythm: Normal rate. Rhythm irregular.   Pulmonary:      Effort: Pulmonary effort is normal.      Breath sounds: Normal breath sounds.   Abdominal:      General: Bowel sounds are normal. There is no distension.      Tenderness: There is no abdominal tenderness.   Musculoskeletal:      Comments: Ambulatory with walker and SBA   Skin:     General: Skin is warm and dry.   Neurological:      Mental Status: He is alert. He is disoriented.   Psychiatric:         Mood and Affect: Mood normal.         Assessment/Plan  Diagnoses and all orders for this visit:  Weakness  Comments:  cont with PT/OT  Hyponatremia  Comments:  reschedule labs  for 12/11/24  Oropharyngeal dysphagia  Comments:  cont with ST, modified diet           Electronically Signed By: CONSTANTIN Wilson-CNP   12/10/24  2:43 PM

## 2024-12-10 NOTE — PROGRESS NOTES
Subjective   Patient ID: Kavon Bowers is a 89 y.o. male who presents for Extremity Weakness.    Following up with patient who came to facility following hospitalization for weakness and confusion. He was treated with antibiotics for a UTI. He had a low sodium which was felt to be what contributed to his delirium. This was corrected and he came to facility for strengthening. He states that he feels good. He is sore and states that it is arthritis. He is on thickened liquids for dysphagia and will be seen by [facility] reviewed in [facility]. Labs were clotted from 12/6/24 and staff to have them redrawn tomorrow         Review of Systems   HENT:  Positive for trouble swallowing.    Musculoskeletal:  Positive for arthralgias and gait problem.   Psychiatric/Behavioral:  Positive for confusion.    All other systems reviewed and are negative.      Objective   /50   Pulse 66   Temp 36.7 °C (98 °F)   Resp 18   Wt 65.1 kg (143 lb 8 oz)   SpO2 96%   BMI 24.63 kg/m²     Physical Exam  Constitutional:       General: He is not in acute distress.     Appearance: He is not ill-appearing.   HENT:      Mouth/Throat:      Mouth: Mucous membranes are moist.   Eyes:      Conjunctiva/sclera: Conjunctivae normal.   Cardiovascular:      Rate and Rhythm: Normal rate. Rhythm irregular.   Pulmonary:      Effort: Pulmonary effort is normal.      Breath sounds: Normal breath sounds.   Abdominal:      General: Bowel sounds are normal. There is no distension.      Tenderness: There is no abdominal tenderness.   Musculoskeletal:      Comments: Ambulatory with walker and SBA   Skin:     General: Skin is warm and dry.   Neurological:      Mental Status: He is alert. He is disoriented.   Psychiatric:         Mood and Affect: Mood normal.         Assessment/Plan   Diagnoses and all orders for this visit:  Weakness  Comments:  cont with PT/OT  Hyponatremia  Comments:  reschedule labs for 12/11/24  Oropharyngeal dysphagia  Comments:  cont with ST,  modified diet

## 2024-12-12 ENCOUNTER — NURSING HOME VISIT (OUTPATIENT)
Dept: POST ACUTE CARE | Facility: EXTERNAL LOCATION | Age: 89
End: 2024-12-12
Payer: MEDICARE

## 2024-12-12 VITALS
RESPIRATION RATE: 18 BRPM | TEMPERATURE: 97.8 F | OXYGEN SATURATION: 95 % | HEART RATE: 90 BPM | SYSTOLIC BLOOD PRESSURE: 122 MMHG | DIASTOLIC BLOOD PRESSURE: 62 MMHG

## 2024-12-12 DIAGNOSIS — T30.0 BURN: ICD-10-CM

## 2024-12-12 DIAGNOSIS — R53.1 WEAKNESS: Primary | ICD-10-CM

## 2024-12-12 DIAGNOSIS — R13.12 OROPHARYNGEAL DYSPHAGIA: ICD-10-CM

## 2024-12-12 PROCEDURE — 99309 SBSQ NF CARE MODERATE MDM 30: CPT | Performed by: NURSE PRACTITIONER

## 2024-12-12 ASSESSMENT — ENCOUNTER SYMPTOMS
ROS SKIN COMMENTS: SEE HPI
CONFUSION: 1
TROUBLE SWALLOWING: 1
BACK PAIN: 1
MYALGIAS: 1

## 2024-12-12 NOTE — LETTER
Patient: Kavon Bowers  : 1933    Encounter Date: 2024    Subjective  Patient ID: Kavon Bowers is a 89 y.o. male who presents for Extremity Weakness.    Following up with patient who was identified to have an area of a burn on his left inner thigh. Silvedene cream has been ordered to be applied to the area but has not come from the pharmacy yet, He states that it is sore when touched. He is now to have all hot beverages with a lid on the cup    He had labs done yesterday showing an elevated BUN. He is on a thickened liquids. He does drink well when liquids are offered    Patient will require a hospital bed for home to assist with turning and repositioning. A standard bed will not suffice for positioning in bed due to his limited mobility and chronic back pain.         Review of Systems   HENT:  Positive for trouble swallowing.    Musculoskeletal:  Positive for back pain, gait problem and myalgias.   Skin:         See HPI   Psychiatric/Behavioral:  Positive for confusion.        Objective  /62   Pulse 90   Temp 36.6 °C (97.8 °F)   Resp 18   SpO2 95%     Physical Exam  Constitutional:       General: He is not in acute distress.     Appearance: He is not ill-appearing.   HENT:      Mouth/Throat:      Mouth: Mucous membranes are moist.   Eyes:      Conjunctiva/sclera: Conjunctivae normal.   Pulmonary:      Effort: Pulmonary effort is normal.   Musculoskeletal:      Comments: Able to transfer with assist of 1   Skin:     General: Skin is warm and dry.      Comments: broken blisters and redness on right inner thigh   Neurological:      Mental Status: He is alert. He is disoriented.   Psychiatric:         Mood and Affect: Affect is flat.         Assessment/Plan  Diagnoses and all orders for this visit:  Weakness  Comments:  hospital bed for discharge to home  Oropharyngeal dysphagia  Comments:  encourage thickened liquids  Burn  Comments:  cont with silvadene cream to affected area until healed            Electronically Signed By: CONSTANTIN Wilson-CNP   12/12/24 12:38 PM

## 2024-12-12 NOTE — PROGRESS NOTES
Subjective   Patient ID: Kavon Bowers is a 89 y.o. male who presents for Extremity Weakness.    Following up with patient who was identified to have an area of a burn on his left inner thigh. Silvedene cream has been ordered to be applied to the area but has not come from the pharmacy yet, He states that it is sore when touched. He is now to have all hot beverages with a lid on the cup    He had labs done yesterday showing an elevated BUN. He is on a thickened liquids. He does drink well when liquids are offered    Patient will require a hospital bed for home to assist with turning and repositioning. A standard bed will not suffice for positioning in bed due to his limited mobility and chronic back pain.         Review of Systems   HENT:  Positive for trouble swallowing.    Musculoskeletal:  Positive for back pain, gait problem and myalgias.   Skin:         See HPI   Psychiatric/Behavioral:  Positive for confusion.        Objective   /62   Pulse 90   Temp 36.6 °C (97.8 °F)   Resp 18   SpO2 95%     Physical Exam  Constitutional:       General: He is not in acute distress.     Appearance: He is not ill-appearing.   HENT:      Mouth/Throat:      Mouth: Mucous membranes are moist.   Eyes:      Conjunctiva/sclera: Conjunctivae normal.   Pulmonary:      Effort: Pulmonary effort is normal.   Musculoskeletal:      Comments: Able to transfer with assist of 1   Skin:     General: Skin is warm and dry.      Comments: broken blisters and redness on right inner thigh   Neurological:      Mental Status: He is alert. He is disoriented.   Psychiatric:         Mood and Affect: Affect is flat.         Assessment/Plan   Diagnoses and all orders for this visit:  Weakness  Comments:  hospital bed for discharge to home  Oropharyngeal dysphagia  Comments:  encourage thickened liquids  Burn  Comments:  cont with silvadene cream to affected area until healed

## 2024-12-16 ENCOUNTER — NURSING HOME VISIT (OUTPATIENT)
Dept: POST ACUTE CARE | Facility: EXTERNAL LOCATION | Age: 89
End: 2024-12-16
Payer: MEDICARE

## 2024-12-16 DIAGNOSIS — I11.0 HYPERTENSIVE HEART DISEASE WITH HEART FAILURE: ICD-10-CM

## 2024-12-16 DIAGNOSIS — R13.12 OROPHARYNGEAL DYSPHAGIA: Primary | ICD-10-CM

## 2024-12-16 DIAGNOSIS — I10 ESSENTIAL HYPERTENSION: ICD-10-CM

## 2024-12-16 DIAGNOSIS — E87.1 HYPONATREMIA: ICD-10-CM

## 2024-12-16 PROCEDURE — 99309 SBSQ NF CARE MODERATE MDM 30: CPT | Performed by: FAMILY MEDICINE

## 2024-12-16 NOTE — LETTER
Patient: Kavon Bowers  : 1933    Encounter Date: 2024    Subjective  Patient ID: Kavon Bowers is a 89 y.o. male who is acute skilled care being seen and evaluated for multiple medical problems.    HPI Pt presented to the hospital with concerns of nausea and vomiting. Lab work revealed urinary tract infection and severe symptomatic hyponatremia. IV antibiotics ordered for UTI. CT imaging showed moderate colonic stool burden and moderate rectal stool burden - bowel regimen added. GI was consulted and enema given. There was a concern for rectal bleeding, however this improved and eliquis was restarted without signs of bleeding. Neurology, nephrology and cardio consulted.    No further issues with vertigo noted today.  We are monitoring his renal function he did have a slight increase in BUN and was noted to be slightly dehydrated.  UTI now resolved.  Further bleeding noted.  Abs on the  showed BUN of 47 creatinine 1.2 GFR 57 and rechecks ordered for later this week.    Review of Systems   Constitutional:  Positive for fatigue. Negative for fever.   HENT:  Negative for congestion and sore throat.    Eyes:  Negative for visual disturbance.   Respiratory:  Negative for cough, chest tightness and shortness of breath.    Cardiovascular:  Negative for chest pain, palpitations and leg swelling.   Gastrointestinal:  Negative for abdominal pain, constipation, diarrhea, nausea and vomiting.   Genitourinary:  Negative for dysuria, frequency and urgency.   Musculoskeletal:  Positive for arthralgias and gait problem.   Skin:  Negative for rash.   Neurological:  Positive for weakness. Negative for dizziness, tremors, syncope and headaches.   Psychiatric/Behavioral:  Negative for dysphoric mood. The patient is not nervous/anxious.        Objective  There were no vitals taken for this visit.    Physical Exam  Vitals (124/67 temp 97.5 pulse 84) reviewed.   Constitutional:       General: He is not in acute distress.      Appearance: Normal appearance.   HENT:      Head: Atraumatic.      Nose: Nose normal.      Mouth/Throat:      Mouth: Mucous membranes are dry.      Pharynx: Oropharynx is clear.   Eyes:      General: No scleral icterus.     Conjunctiva/sclera: Conjunctivae normal.   Cardiovascular:      Rate and Rhythm: Normal rate. Rhythm irregular.      Heart sounds:      No gallop.   Pulmonary:      Effort: Pulmonary effort is normal.      Breath sounds: Normal breath sounds. No wheezing.   Abdominal:      General: There is no distension.      Palpations: There is no mass.      Tenderness: There is no abdominal tenderness. There is no rebound.   Musculoskeletal:      Cervical back: Normal range of motion.   Lymphadenopathy:      Cervical: No cervical adenopathy.   Skin:     General: Skin is warm.      Coloration: Skin is not jaundiced.   Neurological:      Mental Status: Mental status is at baseline.      Motor: Weakness present.      Gait: Gait abnormal.         Assessment/Plan  Problem List Items Addressed This Visit             ICD-10-CM    Essential hypertension I10    Hyponatremia E87.1    Hypertensive heart disease with heart failure I11.0     Other Visit Diagnoses         Codes    Oropharyngeal dysphagia    -  Primary R13.12        Sodium level stable here, rechecks due this week, monitor GFR as there was a slight drop noted with some dehydration most likely due to use of thickened liquids with gnosis of dysphagia noted   Goals    None           Electronically Signed By: Neida Cabello MD   12/17/24 12:26 PM

## 2024-12-17 ENCOUNTER — NURSING HOME VISIT (OUTPATIENT)
Dept: POST ACUTE CARE | Facility: EXTERNAL LOCATION | Age: 89
End: 2024-12-17
Payer: MEDICARE

## 2024-12-17 DIAGNOSIS — R53.1 WEAKNESS: ICD-10-CM

## 2024-12-17 DIAGNOSIS — E87.1 HYPONATREMIA: Primary | ICD-10-CM

## 2024-12-17 DIAGNOSIS — R13.12 OROPHARYNGEAL DYSPHAGIA: ICD-10-CM

## 2024-12-17 PROCEDURE — 99308 SBSQ NF CARE LOW MDM 20: CPT | Performed by: NURSE PRACTITIONER

## 2024-12-17 ASSESSMENT — ENCOUNTER SYMPTOMS
NAUSEA: 0
ARTHRALGIAS: 1
NERVOUS/ANXIOUS: 0
CHEST TIGHTNESS: 0
SHORTNESS OF BREATH: 0
DIARRHEA: 0
DYSPHORIC MOOD: 0
PALPITATIONS: 0
SORE THROAT: 0
FEVER: 0
VOMITING: 0
DIZZINESS: 0
FREQUENCY: 0
DYSURIA: 0
FATIGUE: 1
TREMORS: 0
COUGH: 0
CONSTIPATION: 0
HEADACHES: 0
WEAKNESS: 1
ABDOMINAL PAIN: 0

## 2024-12-17 NOTE — LETTER
Patient: Kavon Bowers  : 1933    Encounter Date: 2024    Subjective  Patient ID: Kavon Bowers is a 89 y.o. male who presents for Abnormal Sodium.    Following up with patient who has a history of low sodium. He had labs last week which a sodium of 140. He has been eating and drinking well. He is on a dysphagia diet with thickened liquids. He continues to participate in therapies and has been spending time out in the common area. His plan is to discharge home with family care         Review of Systems   HENT:  Positive for trouble swallowing.    Musculoskeletal:  Positive for back pain and gait problem.   All other systems reviewed and are negative.      Objective  /58   Pulse 98   Temp 36.7 °C (98 °F)   Resp 16   SpO2 97%     Physical Exam  Constitutional:       General: He is not in acute distress.     Appearance: He is not ill-appearing.   HENT:      Mouth/Throat:      Mouth: Mucous membranes are moist.   Eyes:      Conjunctiva/sclera: Conjunctivae normal.   Cardiovascular:      Rate and Rhythm: Normal rate and regular rhythm.   Pulmonary:      Effort: Pulmonary effort is normal.      Breath sounds: Normal breath sounds.   Skin:     General: Skin is warm and dry.   Neurological:      Mental Status: He is alert. Mental status is at baseline.   Psychiatric:         Mood and Affect: Affect is flat.         Assessment/Plan  Diagnoses and all orders for this visit:  Hyponatremia  Comments:  monitor labs, encourage fluids  Oropharyngeal dysphagia  Comments:  cont with current diet  Weakness  Comments:  cont with therapies           Electronically Signed By: SHELLEY Wilson   24  4:36 PM

## 2024-12-17 NOTE — PROGRESS NOTES
Subjective   Patient ID: Kavon Bowers is a 89 y.o. male who is acute skilled care being seen and evaluated for multiple medical problems.    HPI Pt presented to the hospital with concerns of nausea and vomiting. Lab work revealed urinary tract infection and severe symptomatic hyponatremia. IV antibiotics ordered for UTI. CT imaging showed moderate colonic stool burden and moderate rectal stool burden - bowel regimen added. GI was consulted and enema given. There was a concern for rectal bleeding, however this improved and eliquis was restarted without signs of bleeding. Neurology, nephrology and cardio consulted.    No further issues with vertigo noted today.  We are monitoring his renal function he did have a slight increase in BUN and was noted to be slightly dehydrated.  UTI now resolved.  Further bleeding noted.  Abs on the 11th showed BUN of 47 creatinine 1.2 GFR 57 and rechecks ordered for later this week.    Review of Systems   Constitutional:  Positive for fatigue. Negative for fever.   HENT:  Negative for congestion and sore throat.    Eyes:  Negative for visual disturbance.   Respiratory:  Negative for cough, chest tightness and shortness of breath.    Cardiovascular:  Negative for chest pain, palpitations and leg swelling.   Gastrointestinal:  Negative for abdominal pain, constipation, diarrhea, nausea and vomiting.   Genitourinary:  Negative for dysuria, frequency and urgency.   Musculoskeletal:  Positive for arthralgias and gait problem.   Skin:  Negative for rash.   Neurological:  Positive for weakness. Negative for dizziness, tremors, syncope and headaches.   Psychiatric/Behavioral:  Negative for dysphoric mood. The patient is not nervous/anxious.        Objective   There were no vitals taken for this visit.    Physical Exam  Vitals (124/67 temp 97.5 pulse 84) reviewed.   Constitutional:       General: He is not in acute distress.     Appearance: Normal appearance.   HENT:      Head: Atraumatic.       Nose: Nose normal.      Mouth/Throat:      Mouth: Mucous membranes are dry.      Pharynx: Oropharynx is clear.   Eyes:      General: No scleral icterus.     Conjunctiva/sclera: Conjunctivae normal.   Cardiovascular:      Rate and Rhythm: Normal rate. Rhythm irregular.      Heart sounds:      No gallop.   Pulmonary:      Effort: Pulmonary effort is normal.      Breath sounds: Normal breath sounds. No wheezing.   Abdominal:      General: There is no distension.      Palpations: There is no mass.      Tenderness: There is no abdominal tenderness. There is no rebound.   Musculoskeletal:      Cervical back: Normal range of motion.   Lymphadenopathy:      Cervical: No cervical adenopathy.   Skin:     General: Skin is warm.      Coloration: Skin is not jaundiced.   Neurological:      Mental Status: Mental status is at baseline.      Motor: Weakness present.      Gait: Gait abnormal.         Assessment/Plan   Problem List Items Addressed This Visit             ICD-10-CM    Essential hypertension I10    Hyponatremia E87.1    Hypertensive heart disease with heart failure I11.0     Other Visit Diagnoses         Codes    Oropharyngeal dysphagia    -  Primary R13.12        Sodium level stable here, rechecks due this week, monitor GFR as there was a slight drop noted with some dehydration most likely due to use of thickened liquids with gnosis of dysphagia noted   Goals    None

## 2024-12-19 VITALS
OXYGEN SATURATION: 97 % | DIASTOLIC BLOOD PRESSURE: 58 MMHG | HEART RATE: 98 BPM | RESPIRATION RATE: 16 BRPM | TEMPERATURE: 98 F | SYSTOLIC BLOOD PRESSURE: 102 MMHG

## 2024-12-19 ASSESSMENT — ENCOUNTER SYMPTOMS
TROUBLE SWALLOWING: 1
BACK PAIN: 1

## 2024-12-19 NOTE — PROGRESS NOTES
Subjective   Patient ID: Kavon Bowers is a 89 y.o. male who presents for Abnormal Sodium.    Following up with patient who has a history of low sodium. He had labs last week which a sodium of 140. He has been eating and drinking well. He is on a dysphagia diet with thickened liquids. He continues to participate in therapies and has been spending time out in the common area. His plan is to discharge home with family care         Review of Systems   HENT:  Positive for trouble swallowing.    Musculoskeletal:  Positive for back pain and gait problem.   All other systems reviewed and are negative.      Objective   /58   Pulse 98   Temp 36.7 °C (98 °F)   Resp 16   SpO2 97%     Physical Exam  Constitutional:       General: He is not in acute distress.     Appearance: He is not ill-appearing.   HENT:      Mouth/Throat:      Mouth: Mucous membranes are moist.   Eyes:      Conjunctiva/sclera: Conjunctivae normal.   Cardiovascular:      Rate and Rhythm: Normal rate and regular rhythm.   Pulmonary:      Effort: Pulmonary effort is normal.      Breath sounds: Normal breath sounds.   Skin:     General: Skin is warm and dry.   Neurological:      Mental Status: He is alert. Mental status is at baseline.   Psychiatric:         Mood and Affect: Affect is flat.         Assessment/Plan   Diagnoses and all orders for this visit:  Hyponatremia  Comments:  monitor labs, encourage fluids  Oropharyngeal dysphagia  Comments:  cont with current diet  Weakness  Comments:  cont with therapies

## 2024-12-22 ENCOUNTER — DOCUMENTATION (OUTPATIENT)
Dept: HOME HEALTH SERVICES | Facility: HOME HEALTH | Age: 89
End: 2024-12-22
Payer: MEDICARE

## 2024-12-22 ENCOUNTER — HOME HEALTH ADMISSION (OUTPATIENT)
Dept: HOME HEALTH SERVICES | Facility: HOME HEALTH | Age: 89
End: 2024-12-22
Payer: MEDICARE

## 2024-12-22 NOTE — HH CARE COORDINATION
Home Care received a Referral for Physical Therapy, Occupational Therapy, and Speech Language Pathology. We have processed the referral for a Start of Care on 12.24 or 12.25.     If you have any questions or concerns, please feel free to contact us at [phone]. Follow the prompts, enter your five digit zip code, and you will be directed to your care team on [facility].

## 2024-12-24 ENCOUNTER — PATIENT OUTREACH (OUTPATIENT)
Dept: PRIMARY CARE | Facility: CLINIC | Age: 89
End: 2024-12-24
Payer: MEDICARE

## 2024-12-24 NOTE — PROGRESS NOTES
Discharge Facility: [facility]  Discharge Diagnosis: Encephalopathy  Admission Date: 12/05/2024  Discharge Date: 12/23/2024    PCP Appointment Date: 01/03/2025  Specialist Appointment Date: None  Hospital Encounter and Summary Linked: No    See discharge assessment below for further details    Medications  Medications reviewed with patient/caregiver?: Yes (12/24/2024  1:07 PM)  Is the patient having any side effects they believe may be caused by any medication additions or changes?: No (12/24/2024  1:07 PM)  Does the patient have all medications ordered at discharge?: Yes (12/24/2024  1:07 PM)  Prescription Comments: patient has all needed medication (12/24/2024  1:07 PM)  Is the patient taking all medications as directed (includes completed medication regime)?: Yes (12/24/2024  1:07 PM)  Medication Comments: Patients wife denies any issues obtaining or affording medication (12/24/2024  1:07 PM)    Appointments  Does the patient have a primary care provider?: Yes (12/24/2024  1:07 PM)  Care Management Interventions: Verified appointment date/time/provider (PCP 01/03/2025) (12/24/2024  1:07 PM)  Has the patient kept scheduled appointments due by today?: Yes (12/24/2024  1:07 PM)    Self Management  What is the home health agency?: N/A (12/24/2024  1:07 PM)  What Durable Medical Equipment (DME) was ordered?: N/A (12/24/2024  1:07 PM)    Patient Teaching  Does the patient have access to their discharge instructions?: Yes (12/24/2024  1:07 PM)  Care Management Interventions: Reviewed instructions with patient (12/24/2024  1:07 PM)  What is the patient's perception of their health status since discharge?: Improving (12/24/2024  1:07 PM)  Is the patient/caregiver able to teach back the hierarchy of who to call/visit for symptoms/problems? PCP, Specialist, Home Health nurse, Urgent Care, ED, 911: Yes (12/24/2024  1:07 PM)  Patient/Caregiver Education Comments: CM spoke to patients wife Briseida via phone. She  states that he is doing well at home. They have all needed medications at the home. PCP follow up appointment is scheduled for 01/03/2025. She has no questions at this time and was thankful for this call. (12/24/2024  1:07 PM)

## 2024-12-27 ENCOUNTER — HOME CARE VISIT (OUTPATIENT)
Dept: HOME HEALTH SERVICES | Facility: HOME HEALTH | Age: 89
End: 2024-12-27
Payer: MEDICARE

## 2024-12-27 VITALS
DIASTOLIC BLOOD PRESSURE: 75 MMHG | SYSTOLIC BLOOD PRESSURE: 108 MMHG | TEMPERATURE: 97 F | HEART RATE: 63 BPM | OXYGEN SATURATION: 100 %

## 2024-12-27 PROCEDURE — 169592 NO-PAY CLAIM PROCEDURE

## 2024-12-27 PROCEDURE — G0151 HHCP-SERV OF PT,EA 15 MIN: HCPCS | Mod: HHH | Performed by: PHYSICAL THERAPIST

## 2024-12-27 ASSESSMENT — ACTIVITIES OF DAILY LIVING (ADL)
ENTERING_EXITING_HOME: MINIMUM ASSIST
OASIS_M1830: 06

## 2024-12-27 ASSESSMENT — ENCOUNTER SYMPTOMS
MUSCLE WEAKNESS: 1
PAIN: 1
PAIN LOCATION - PAIN SEVERITY: 2/10
PAIN LOCATION: BACK
PERSON REPORTING PAIN: PATIENT

## 2024-12-31 ENCOUNTER — HOME CARE VISIT (OUTPATIENT)
Dept: HOME HEALTH SERVICES | Facility: HOME HEALTH | Age: 89
End: 2024-12-31
Payer: MEDICARE

## 2024-12-31 VITALS — OXYGEN SATURATION: 97 % | DIASTOLIC BLOOD PRESSURE: 94 MMHG | HEART RATE: 96 BPM | SYSTOLIC BLOOD PRESSURE: 114 MMHG

## 2024-12-31 PROCEDURE — G0152 HHCP-SERV OF OT,EA 15 MIN: HCPCS | Mod: HHH

## 2024-12-31 PROCEDURE — G0153 HHCP-SVS OF S/L PATH,EA 15MN: HCPCS | Mod: HHH

## 2024-12-31 ASSESSMENT — ENCOUNTER SYMPTOMS
DENIES PAIN: 1
PERSON REPORTING PAIN: PATIENT
COUGH CHARACTERISTICS: WEAK VOLITIONAL COUGH

## 2025-01-01 ENCOUNTER — TELEPHONE (OUTPATIENT)
Dept: PRIMARY CARE | Facility: CLINIC | Age: OVER 89
End: 2025-01-01
Payer: MEDICARE

## 2025-01-01 ENCOUNTER — NURSING HOME VISIT (OUTPATIENT)
Dept: POST ACUTE CARE | Facility: EXTERNAL LOCATION | Age: OVER 89
End: 2025-01-01
Payer: MEDICARE

## 2025-01-01 VITALS
OXYGEN SATURATION: 95 % | RESPIRATION RATE: 16 BRPM | WEIGHT: 120.6 LBS | HEART RATE: 81 BPM | BODY MASS INDEX: 20.7 KG/M2 | SYSTOLIC BLOOD PRESSURE: 101 MMHG | TEMPERATURE: 97.7 F | DIASTOLIC BLOOD PRESSURE: 54 MMHG

## 2025-01-01 DIAGNOSIS — I48.0 PAROXYSMAL ATRIAL FIBRILLATION (MULTI): Primary | ICD-10-CM

## 2025-01-01 DIAGNOSIS — E44.0 MODERATE PROTEIN-CALORIE MALNUTRITION (MULTI): ICD-10-CM

## 2025-01-01 DIAGNOSIS — I10 ESSENTIAL HYPERTENSION: ICD-10-CM

## 2025-01-01 DIAGNOSIS — E87.1 HYPONATREMIA: ICD-10-CM

## 2025-01-01 DIAGNOSIS — R41.841 COGNITIVE COMMUNICATION DEFICIT: ICD-10-CM

## 2025-01-01 DIAGNOSIS — E22.2 SIADH (SYNDROME OF INAPPROPRIATE ADH PRODUCTION) (MULTI): ICD-10-CM

## 2025-01-01 DIAGNOSIS — R53.83 LETHARGY: Primary | ICD-10-CM

## 2025-01-01 PROCEDURE — 99309 SBSQ NF CARE MODERATE MDM 30: CPT | Performed by: NURSE PRACTITIONER

## 2025-01-01 PROCEDURE — 99309 SBSQ NF CARE MODERATE MDM 30: CPT | Performed by: FAMILY MEDICINE

## 2025-01-01 ASSESSMENT — ENCOUNTER SYMPTOMS
SHORTNESS OF BREATH: 0
PALPITATIONS: 0
DIZZINESS: 0
COUGH: 0
ABDOMINAL PAIN: 0
WEAKNESS: 1
BACK PAIN: 1
TROUBLE SWALLOWING: 1

## 2025-01-02 VITALS — OXYGEN SATURATION: 99 % | DIASTOLIC BLOOD PRESSURE: 70 MMHG | SYSTOLIC BLOOD PRESSURE: 110 MMHG | HEART RATE: 74 BPM

## 2025-01-02 ASSESSMENT — ACTIVITIES OF DAILY LIVING (ADL)
TOILETING: MINIMUM ASSIST
DRESSING_LB_CURRENT_FUNCTION: MINIMUM ASSIST
TOILETING: 1
DRESSING_UB_CURRENT_FUNCTION: SUPERVISION
PREPARING MEALS: DEPENDENT
BATHING ASSESSED: 1
BATHING_CURRENT_FUNCTION: MODERATE ASSIST

## 2025-01-02 ASSESSMENT — ENCOUNTER SYMPTOMS
PERSON REPORTING PAIN: PATIENT
DENIES PAIN: 1

## 2025-01-03 ENCOUNTER — LAB (OUTPATIENT)
Dept: LAB | Facility: LAB | Age: OVER 89
End: 2025-01-03
Payer: MEDICARE

## 2025-01-03 ENCOUNTER — HOME CARE VISIT (OUTPATIENT)
Dept: HOME HEALTH SERVICES | Facility: HOME HEALTH | Age: OVER 89
End: 2025-01-03
Payer: MEDICARE

## 2025-01-03 ENCOUNTER — APPOINTMENT (OUTPATIENT)
Dept: PRIMARY CARE | Facility: CLINIC | Age: OVER 89
End: 2025-01-03
Payer: MEDICARE

## 2025-01-03 VITALS
HEART RATE: 67 BPM | SYSTOLIC BLOOD PRESSURE: 120 MMHG | DIASTOLIC BLOOD PRESSURE: 86 MMHG | OXYGEN SATURATION: 98 % | TEMPERATURE: 96.8 F

## 2025-01-03 VITALS
HEART RATE: 89 BPM | BODY MASS INDEX: 20.94 KG/M2 | WEIGHT: 122 LBS | SYSTOLIC BLOOD PRESSURE: 124 MMHG | OXYGEN SATURATION: 95 % | TEMPERATURE: 98 F | DIASTOLIC BLOOD PRESSURE: 78 MMHG

## 2025-01-03 DIAGNOSIS — R13.10 DYSPHAGIA, UNSPECIFIED TYPE: ICD-10-CM

## 2025-01-03 DIAGNOSIS — E87.1 HYPONATREMIA: ICD-10-CM

## 2025-01-03 DIAGNOSIS — I10 ESSENTIAL HYPERTENSION: ICD-10-CM

## 2025-01-03 DIAGNOSIS — E87.1 HYPONATREMIA: Primary | ICD-10-CM

## 2025-01-03 LAB
ANION GAP SERPL CALCULATED.3IONS-SCNC: 10 MMOL/L (ref 10–20)
BUN SERPL-MCNC: 21 MG/DL (ref 6–23)
CALCIUM SERPL-MCNC: 8.3 MG/DL (ref 8.6–10.3)
CHLORIDE SERPL-SCNC: 102 MMOL/L (ref 98–107)
CO2 SERPL-SCNC: 28 MMOL/L (ref 21–32)
CREAT SERPL-MCNC: 0.84 MG/DL (ref 0.5–1.3)
EGFRCR SERPLBLD CKD-EPI 2021: 82 ML/MIN/1.73M*2
GLUCOSE SERPL-MCNC: 87 MG/DL (ref 74–99)
POTASSIUM SERPL-SCNC: 3.5 MMOL/L (ref 3.5–5.3)
SODIUM SERPL-SCNC: 136 MMOL/L (ref 136–145)

## 2025-01-03 PROCEDURE — 99495 TRANSJ CARE MGMT MOD F2F 14D: CPT | Performed by: FAMILY MEDICINE

## 2025-01-03 PROCEDURE — G0151 HHCP-SERV OF PT,EA 15 MIN: HCPCS | Mod: HHH | Performed by: PHYSICAL THERAPIST

## 2025-01-03 PROCEDURE — 1126F AMNT PAIN NOTED NONE PRSNT: CPT | Performed by: FAMILY MEDICINE

## 2025-01-03 PROCEDURE — 3078F DIAST BP <80 MM HG: CPT | Performed by: FAMILY MEDICINE

## 2025-01-03 PROCEDURE — 1123F ACP DISCUSS/DSCN MKR DOCD: CPT | Performed by: FAMILY MEDICINE

## 2025-01-03 PROCEDURE — 1160F RVW MEDS BY RX/DR IN RCRD: CPT | Performed by: FAMILY MEDICINE

## 2025-01-03 PROCEDURE — 80048 BASIC METABOLIC PNL TOTAL CA: CPT

## 2025-01-03 PROCEDURE — 1158F ADVNC CARE PLAN TLK DOCD: CPT | Performed by: FAMILY MEDICINE

## 2025-01-03 PROCEDURE — G0153 HHCP-SVS OF S/L PATH,EA 15MN: HCPCS | Mod: HHH

## 2025-01-03 PROCEDURE — 1159F MED LIST DOCD IN RCRD: CPT | Performed by: FAMILY MEDICINE

## 2025-01-03 PROCEDURE — 3074F SYST BP LT 130 MM HG: CPT | Performed by: FAMILY MEDICINE

## 2025-01-03 RX ORDER — METOPROLOL SUCCINATE 25 MG/1
25 TABLET, EXTENDED RELEASE ORAL DAILY
Qty: 90 TABLET | Refills: 3 | Status: SHIPPED | OUTPATIENT
Start: 2025-01-03 | End: 2026-01-03

## 2025-01-03 ASSESSMENT — ENCOUNTER SYMPTOMS
PERSON REPORTING PAIN: PATIENT
DENIES PAIN: 1

## 2025-01-03 ASSESSMENT — PATIENT HEALTH QUESTIONNAIRE - PHQ9
1. LITTLE INTEREST OR PLEASURE IN DOING THINGS: NOT AT ALL
SUM OF ALL RESPONSES TO PHQ9 QUESTIONS 1 AND 2: 0
2. FEELING DOWN, DEPRESSED OR HOPELESS: NOT AT ALL

## 2025-01-03 ASSESSMENT — PAIN SCALES - GENERAL: PAINLEVEL_OUTOF10: 0-NO PAIN

## 2025-01-03 NOTE — PROGRESS NOTES
"Patient: Kavon Bowers  : 1933  PCP: Katherine Dasilva MD  MRN: [id number]  Program: Transitional Care Management  Status: Enrolled  Effective Dates: 2024 - present  Responsible Staff: Heather White  Social Drivers to be Addressed: No information to display         Kavon Bowers is a 89 y.o. male presenting today for follow-up after being discharged from the hospital 10 days ago. The main problem requiring admission was hyponatremia. The discharge summary and/or Transitional Care Management documentation was reviewed. Medication reconciliation was performed as indicated via the \"[place] as Reviewed\" timestamp.     Kavon Bowers was contacted by Transitional Care Management services two days after his discharge. This encounter and supporting documentation was reviewed.    Unbeknownst to family, he opted to cut his salt tablets in half. Became hyponatremic - confusion, back pain, nausea, and vomiting. Went to hospital - Na 124. Admitted and sodium replaced.  Then went to SNF.  Hyponatremia is due to eating unprocessed food and not enough salt.  Since being home, son has reverted meds back to original medications. Son is taking care of him along with wife.     Review of Systems  All other systems have been reviewed and are negative except as noted in the HPI.       /78   Pulse 89   Temp 36.7 °C (98 °F)   Wt 55.3 kg (122 lb)   SpO2 95%   BMI 20.94 kg/m²     Physical Exam  Vitals and nursing note reviewed.   Constitutional:       Appearance: Normal appearance.   Eyes:      Extraocular Movements: Extraocular movements intact.      Conjunctiva/sclera: Conjunctivae normal.      Pupils: Pupils are equal, round, and reactive to light.   Cardiovascular:      Rate and Rhythm: Normal rate and regular rhythm.      Heart sounds: No murmur heard.  Pulmonary:      Effort: Pulmonary effort is normal.      Breath sounds: Normal breath sounds.   Neurological:      General: No focal deficit present.      Mental Status: He " is alert.   Psychiatric:         Mood and Affect: Mood normal.         The complexity of medical decision making for this patient's transitional care is moderate.    Assessment/Plan   Assessment & Plan  Hyponatremia  Stressed the importance of continuing to take his sodium tablets.  BMP order placed.  Discussed with son that we will make this a standing order so that anytime son notices change in behavior he can have lab drawn  Orders:    Basic metabolic panel; Standing    Dysphagia, unspecified type  Recommend swallow evaluation  Orders:    [place] modified barium swallow study; Future    Essential hypertension  Continue with metoprolol as prescribed.  Orders:    metoprolol succinate XL (Toprol-XL) 25 mg 24 hr tablet; Take 1 tablet (25 mg) by mouth once daily.

## 2025-01-06 ENCOUNTER — TELEPHONE (OUTPATIENT)
Dept: PRIMARY CARE | Facility: CLINIC | Age: OVER 89
End: 2025-01-06
Payer: MEDICARE

## 2025-01-06 ENCOUNTER — PATIENT OUTREACH (OUTPATIENT)
Dept: PRIMARY CARE | Facility: CLINIC | Age: OVER 89
End: 2025-01-06
Payer: MEDICARE

## 2025-01-06 DIAGNOSIS — I48.0 PAROXYSMAL ATRIAL FIBRILLATION (MULTI): ICD-10-CM

## 2025-01-06 NOTE — TELEPHONE ENCOUNTER
Spoke to son and informed. He will relay information to patient. He is requesting a refill on eliquis for patient. It looks like Dr. Mendoza was filling this but son said he wasn't going to be following up with him anymore that he discussed with you about taking this rx over. Rx pended.

## 2025-01-06 NOTE — PROGRESS NOTES
Call regarding appt. with PCP on 01/03/2025 after hospitalization.  At time of outreach call the patient feels as if their condition has returned to baseline since last visit.  Reviewed the PCP appointment with the pt and addressed any questions or concerns.

## 2025-01-06 NOTE — TELEPHONE ENCOUNTER
----- Message from Katherine Dasilva sent at 1/3/2025  8:16 PM EST -----  Please Call patient and his son Eliezer to let them know that his sodium is good at 136.

## 2025-01-07 ENCOUNTER — HOSPITAL ENCOUNTER (OUTPATIENT)
Dept: RADIOLOGY | Facility: HOSPITAL | Age: OVER 89
Discharge: HOME | End: 2025-01-07
Payer: MEDICARE

## 2025-01-07 ENCOUNTER — HOME CARE VISIT (OUTPATIENT)
Dept: HOME HEALTH SERVICES | Facility: HOME HEALTH | Age: OVER 89
End: 2025-01-07
Payer: MEDICARE

## 2025-01-07 DIAGNOSIS — R13.10 DYSPHAGIA, UNSPECIFIED TYPE: ICD-10-CM

## 2025-01-07 PROCEDURE — 2500000005 HC RX 250 GENERAL PHARMACY W/O HCPCS: Performed by: FAMILY MEDICINE

## 2025-01-07 PROCEDURE — 74230 X-RAY XM SWLNG FUNCJ C+: CPT

## 2025-01-07 PROCEDURE — 74230 X-RAY XM SWLNG FUNCJ C+: CPT | Performed by: RADIOLOGY

## 2025-01-07 PROCEDURE — 92611 MOTION FLUOROSCOPY/SWALLOW: CPT | Mod: GN

## 2025-01-07 PROCEDURE — 92526 ORAL FUNCTION THERAPY: CPT | Mod: GN

## 2025-01-07 RX ADMIN — BARIUM SULFATE 65 ML: 400 SUSPENSION ORAL at 15:26

## 2025-01-07 RX ADMIN — BARIUM SULFATE 90 ML: 0.81 POWDER, FOR SUSPENSION ORAL at 15:25

## 2025-01-07 RX ADMIN — BARIUM SULFATE 700 MG: 700 TABLET ORAL at 15:27

## 2025-01-07 RX ADMIN — BARIUM SULFATE 10 ML: 400 PASTE ORAL at 15:26

## 2025-01-07 NOTE — ASSESSMENT & PLAN NOTE
Stressed the importance of continuing to take his sodium tablets.  BMP order placed.  Discussed with son that we will make this a standing order so that anytime son notices change in behavior he can have lab drawn  Orders:    Basic metabolic panel; Standing

## 2025-01-07 NOTE — ADDENDUM NOTE
Encounter addended by: Coco Patel, SLP on: 1/7/2025 5:25 PM   Actions taken: Flowsheet accepted, Clinical Note Signed, Charge Capture section accepted

## 2025-01-07 NOTE — ASSESSMENT & PLAN NOTE
Continue with metoprolol as prescribed.  Orders:    metoprolol succinate XL (Toprol-XL) 25 mg 24 hr tablet; Take 1 tablet (25 mg) by mouth once daily.

## 2025-01-07 NOTE — PROCEDURES
Speech-Language Pathology    Outpatient Modified Barium Swallow Study    Patient Name: Kavon Bowers  MRN: [id number]  : 1933  Today's Date: 25     Time Calculation  Start Time: 1310  Stop Time: 1500  Time Calculation (min): 110 min       Modified Barium Swallow Study completed. Informed verbal consent obtained prior to completion of exam. Trials of thin, nectar/mildly thick liquid, puree, regular solids and barium tablet with thin liquid were given.     Modified Barium Swallow Study completed. Informed verbal consent obtained prior to completion of exam. The study was completed per protocol with various liquid barium consistencies, pudding, solids and a 13mm barium tablet.  A 1.9 cm or .75 inch (outer diameter) ring was placed on the chin in the lateral view and on the lateral, left side of the neck in the a-p view in order to complete objective measurements during swallowing. The anatomic structures and function of the oropharynx, larynx, hypopharynx and cervical esophagus were evaluated.    SLP: AMERICA Monsivais   Contact info: [employer] secure chat; phone: [phone] Option 2    Reason for Referral: assess for diet upgrade to thin liquids  Patient Hx per chart:  Hyponatremia, dysphagia, essential hypertension    Respiratory Status: Room air  Current diet:   Regular solids/nectar thick liquids    Pain:  Pain Scale: 0-10  Ratin    DIET RECOMMENDATIONS:   - Soft & Bite Sized (IDDSI Level 6)  - Thin liquids (IDDSI Level 0)  - Medications crushed in puree following clearance from MD/pharmacy.    Per the results of today's MBSS, patient to begin diet of soft & bite size consistencies and thin liquids following swallow strategies listed below:    STRATEGIES:  - Small bites  - Small, single sips  - Alternate consistencies  - Effortful swallow  - Upright for all PO intake  - Swallow 2-3 times per bite/sip  - Crush medications in puree    SLP PLAN:  Skilled SLP Services: Skilled SLP intervention for  dysphagia is warranted.  SLP Frequency: To be determined by treating SLP in home health setting  Duration:  Treatment/Interventions:   - Oropharyngeal exercises  - Bolus trials  - Diet tolerance/advancement  - Patient/caregiver education    Discussed POC: Patient and son  Discussed Risks/Benefits: Yes  Patient/Caregiver Agreeable: Yes    Short term goals established 01/07/25:   - Patient will tolerate baseline/recommended PO diet without overt s/s of aspiration, further difficulty, or concern for aspiration-related complications in 90% of observed therapeutic trials.  - Patient will implement safe swallowing strategies to reduce risk of aspiration and other s/sx of dysphagia in 90% of trials given caregiver assistance/cueing as needed.  - Patient/Family will verbalize/demonstrate comprehension of dysphagia education, strategies, recommendations and POC.      Long term goals 01/07/25:   Patient will tolerate current diet without overt difficulty in 90% of opportunities or further pulmonary compromise.      Education Provided: Results and recommendations per MBSS, with video review; recommendations and POC at this time with patient and son. Verbal understanding and agreement given on all accounts.     Treatment Provided Today: ST provided extensive education and training to pt/pt family regarding anatomy/physiology of swallow function, risk factors of aspiration/aspiration pna & how to mitigate factors, diet modifications, and the use of compensatory swallow strategies to promote pt safety upon PO intake including single sips of thin liquids and alternating solids and liquids to clear pharyngeal residue.     Patient will need to crush medications in puree (following clearance from MD/Pharmacy) due to barium tablet not clearing from left valleculae despite multiple trials of thin, nectar pudding and additional solid trials in attempt to clear. Following greater then 60 minutes, the tablet was nearly dissolved. The son  was shown the residual tablet on video - he and the patient understand some tablet remains in the vallecular space. The patient will drink water to continue to dissolve the tablet. They would like to leave the fluorsoscopy suite. The radiology supervisor assisted with dissolving the tablet and agreed to clear the patient to leave. He left in good condition.    In addition, ST provided instruction/training on swallowing strategies and crushing of pills.     Repeat Study: Per MD or treating SLP     Mechanics of the Swallow Summary:  ORAL PHASE:  Lip Closure - No labial escape/anterior loss of bolus   Tongue Control During Bolus Hold - Posterior escape of less than half of the bolus   Bolus prep/mastication - Slow prolonged mastication with complete re-collection necessary   Bolus transport/lingual motion - Brisk tongue motion for A-P movement of the bolus   Oral residue - Trace residue lining oral structures     PHARYNGEAL PHASE:  Initiation of pharyngeal swallow - Bolus head at pit of pyriforms   Soft palate elevation - No bolus between soft palate/pharyngeal wall   Laryngeal elevation - Complete superior movement of thyroid cartilage and/or partial approximation of arytenoids to epiglottic petiole   Anterior hyoid excursion - Complete anterior movement   Epiglottic movement - No inversion   Laryngeal vestibule closure - Incomplete - narrow column of air/contrast in laryngeal vestibule   Pharyngeal stripping wave - Complete  Pharyngeal contraction (A/P view) - Not tested       Pharyngoesophageal segment opening - Complete distension and complete duration/no obstruction of flow of bolus   Tongue base retraction - Narrow column of contrast or air between tongue base and pharyngeal wall   Pharyngeal residue - Collection of residue within or on the pharyngeal structures     ESOPHAGEAL PHASE:  Esophageal clearance - Esophageal retention with retrograde flow below the pharyngoesophageal segment with thin liquids and  pudding during screening.    Noted cervical osteophytes throughout cervical spine.    SLP Impressions with Severity Rating:   Pt presents with mild oropharyngeal dysphagia upon completion of modified barium swallow study this date. Swallowing physiology is detailed above.   Impairments most impacting swallowing safety and efficiency include absent epiglottic ROM with subsequent pharyngeal residue with solids and tablet. Liquid chaser reduces solid residue to minimal. However, barium tablet was unable to be cleared following multiple trials of thin, nectar, pudding and solid boluses. Following greater then 60 minutes the table dissolved to minimal - the patient and his son were shown on recording the remaining tablet. They wished to leave the radiology suite, understood the need to drink plenty of liquids to continue dissolving the tablet. The patient left in good condition.    Patient demonstrated silent laryngeal penetration with thin, nectar thick liquids and pudding. Continuous bolus of liquids (thin and nectar) was noted to have deep laryngeal penetration , though continued to remain above the vocal cords. Suggested sips of thin liquids via cup (vs continuous bolus) to minimize aspiration risk. No aspiration was visualized during study though patient remains at risk with thin liquids, notably consecutive sips. The patient and son understand the aspiration risk and wish to continue with thin liquids. Suggest continuation of home health speech therapy to improve pharyngeal function/pharyngeal residue clearance, and for education/training on single sips.    *Of note: The A-P bolus follow-through is not intended to be utilized as a diagnostic assessment of the esophagus, rather a tool to observe the biomechanical aspects of the swallow continuum and to inform the need for further evaluation by medical specialists, as applicable.     Strategies attempted- Liquid chaser resulted in improved clearance of solids.        OUTCOME MEASURES:  Functional Oral Intake Scale  Functional Oral Intake Scale: Level 7        total oral diet with no restrictions       Eating Assessment Tool (EAT-10)   0=No problem, 1=Mild problem, 2=Mild to moderate problem, 3=Moderate problem, 4=Severe problem    EAT 10  My swallowing problem has caused me to lose weight.: 0  My swallowing problem interferes with my ability to go out for meals.: 0  Swallowing liquids takes extra effort.: 4  Swallowing solids takes extra effort.: 0  Swallowing pills takes extra effort.: 2  Swallowing is painful: 0  The pleasure of eating is affected by my swallowing.: 4  When I swallow food sticks in my throat.: 0  I cough when I eat.: 0  Swallowing is stressful: 0  EAT-10 TOTAL SCORE:: 10    A total score of 3 or above may indicate difficulty with swallowing safely and/or efficiently      Rosenbek's Penetration Aspiration Scale  Thin Liquids: 3. PENETRATION with LOW ASPIRATION risk - contrast remains above vocal cords, visible residue]   Nectar Thick Liquids: 3. PENETRATION with LOW ASPIRATION risk - contrast remains above vocal cords, visible residue]   Puree: 2. PENETRATION that CLEARS - contrast enter airway, above vocal cords, no residue  Solids: 1. NO ASPIRATION & NO PENETRATION - no aspiration, contrast does not enter airway

## 2025-01-08 ENCOUNTER — HOME CARE VISIT (OUTPATIENT)
Dept: HOME HEALTH SERVICES | Facility: HOME HEALTH | Age: OVER 89
End: 2025-01-08
Payer: MEDICARE

## 2025-01-08 VITALS
TEMPERATURE: 96.9 F | SYSTOLIC BLOOD PRESSURE: 120 MMHG | OXYGEN SATURATION: 100 % | DIASTOLIC BLOOD PRESSURE: 88 MMHG | HEART RATE: 85 BPM

## 2025-01-08 PROCEDURE — G0151 HHCP-SERV OF PT,EA 15 MIN: HCPCS | Mod: HHH | Performed by: PHYSICAL THERAPIST

## 2025-01-08 ASSESSMENT — ENCOUNTER SYMPTOMS
PERSON REPORTING PAIN: PATIENT
MUSCLE WEAKNESS: 1
DENIES PAIN: 1

## 2025-01-09 ENCOUNTER — HOME CARE VISIT (OUTPATIENT)
Dept: HOME HEALTH SERVICES | Facility: HOME HEALTH | Age: OVER 89
End: 2025-01-09
Payer: MEDICARE

## 2025-01-09 PROCEDURE — G0152 HHCP-SERV OF OT,EA 15 MIN: HCPCS | Mod: HHH

## 2025-01-10 ENCOUNTER — HOME CARE VISIT (OUTPATIENT)
Dept: HOME HEALTH SERVICES | Facility: HOME HEALTH | Age: OVER 89
End: 2025-01-10
Payer: MEDICARE

## 2025-01-10 PROCEDURE — G0153 HHCP-SVS OF S/L PATH,EA 15MN: HCPCS | Mod: HHH

## 2025-01-11 VITALS — SYSTOLIC BLOOD PRESSURE: 130 MMHG | DIASTOLIC BLOOD PRESSURE: 80 MMHG | OXYGEN SATURATION: 97 % | HEART RATE: 99 BPM

## 2025-01-11 ASSESSMENT — ENCOUNTER SYMPTOMS
PERSON REPORTING PAIN: PATIENT
PERSON REPORTING PAIN: PATIENT
DENIES PAIN: 1
DENIES PAIN: 1

## 2025-01-13 ENCOUNTER — HOME CARE VISIT (OUTPATIENT)
Dept: HOME HEALTH SERVICES | Facility: HOME HEALTH | Age: OVER 89
End: 2025-01-13
Payer: MEDICARE

## 2025-01-13 PROCEDURE — G0152 HHCP-SERV OF OT,EA 15 MIN: HCPCS | Mod: HHH

## 2025-01-14 ENCOUNTER — HOME CARE VISIT (OUTPATIENT)
Dept: HOME HEALTH SERVICES | Facility: HOME HEALTH | Age: OVER 89
End: 2025-01-14
Payer: MEDICARE

## 2025-01-14 PROCEDURE — G0153 HHCP-SVS OF S/L PATH,EA 15MN: HCPCS | Mod: HHH

## 2025-01-15 ENCOUNTER — HOME CARE VISIT (OUTPATIENT)
Dept: HOME HEALTH SERVICES | Facility: HOME HEALTH | Age: OVER 89
End: 2025-01-15
Payer: MEDICARE

## 2025-01-15 VITALS — DIASTOLIC BLOOD PRESSURE: 90 MMHG | OXYGEN SATURATION: 96 % | HEART RATE: 75 BPM | SYSTOLIC BLOOD PRESSURE: 119 MMHG

## 2025-01-15 VITALS
SYSTOLIC BLOOD PRESSURE: 116 MMHG | HEART RATE: 67 BPM | TEMPERATURE: 96.7 F | OXYGEN SATURATION: 100 % | DIASTOLIC BLOOD PRESSURE: 82 MMHG

## 2025-01-15 PROCEDURE — G0151 HHCP-SERV OF PT,EA 15 MIN: HCPCS | Mod: HHH | Performed by: PHYSICAL THERAPIST

## 2025-01-15 ASSESSMENT — ENCOUNTER SYMPTOMS
MUSCLE WEAKNESS: 1
PAIN: 1
PERSON REPORTING PAIN: PATIENT
PAIN LOCATION - PAIN QUALITY: MILD ACHE
PAIN LOCATION - PAIN FREQUENCY: INTERMITTENT
DENIES PAIN: 1
PAIN LOCATION: BACK
PERSON REPORTING PAIN: PATIENT
PAIN LOCATION - PAIN SEVERITY: 1/10

## 2025-01-17 ENCOUNTER — HOME CARE VISIT (OUTPATIENT)
Dept: HOME HEALTH SERVICES | Facility: HOME HEALTH | Age: OVER 89
End: 2025-01-17
Payer: MEDICARE

## 2025-01-17 PROCEDURE — G0153 HHCP-SVS OF S/L PATH,EA 15MN: HCPCS | Mod: HHH

## 2025-01-18 VITALS — DIASTOLIC BLOOD PRESSURE: 90 MMHG | OXYGEN SATURATION: 97 % | SYSTOLIC BLOOD PRESSURE: 135 MMHG | HEART RATE: 87 BPM

## 2025-01-18 ASSESSMENT — ENCOUNTER SYMPTOMS
PERSON REPORTING PAIN: PATIENT
PERSON REPORTING PAIN: PATIENT
DENIES PAIN: 1
DENIES PAIN: 1

## 2025-01-20 ENCOUNTER — HOME CARE VISIT (OUTPATIENT)
Dept: HOME HEALTH SERVICES | Facility: HOME HEALTH | Age: OVER 89
End: 2025-01-20
Payer: MEDICARE

## 2025-01-20 PROCEDURE — G0152 HHCP-SERV OF OT,EA 15 MIN: HCPCS | Mod: HHH

## 2025-01-21 DIAGNOSIS — M10.9 GOUT, UNSPECIFIED CAUSE, UNSPECIFIED CHRONICITY, UNSPECIFIED SITE: ICD-10-CM

## 2025-01-21 DIAGNOSIS — I11.0 HYPERTENSIVE HEART DISEASE WITH HEART FAILURE: Primary | ICD-10-CM

## 2025-01-22 ENCOUNTER — HOME CARE VISIT (OUTPATIENT)
Dept: HOME HEALTH SERVICES | Facility: HOME HEALTH | Age: OVER 89
End: 2025-01-22
Payer: MEDICARE

## 2025-01-22 VITALS — HEART RATE: 70 BPM | OXYGEN SATURATION: 100 % | SYSTOLIC BLOOD PRESSURE: 130 MMHG | DIASTOLIC BLOOD PRESSURE: 82 MMHG

## 2025-01-22 PROCEDURE — G0151 HHCP-SERV OF PT,EA 15 MIN: HCPCS | Mod: HHH | Performed by: PHYSICAL THERAPIST

## 2025-01-22 ASSESSMENT — ENCOUNTER SYMPTOMS
PAIN LOCATION - PAIN SEVERITY: 3/10
PAIN LOCATION: BACK
PAIN: 1
PERSON REPORTING PAIN: PATIENT

## 2025-01-22 ASSESSMENT — ACTIVITIES OF DAILY LIVING (ADL)
HOME_HEALTH_OASIS: 01
AMBULATION ASSISTANCE: 1
AMBULATION ASSISTANCE: INDEPENDENT
OASIS_M1830: 03

## 2025-01-23 VITALS — DIASTOLIC BLOOD PRESSURE: 86 MMHG | SYSTOLIC BLOOD PRESSURE: 124 MMHG

## 2025-01-23 ASSESSMENT — ACTIVITIES OF DAILY LIVING (ADL)
BATHING_CURRENT_FUNCTION: STAND BY ASSIST
AMBULATION ASSISTANCE: INDEPENDENT
DRESSING_LB_CURRENT_FUNCTION: INDEPENDENT
DRESSING_UB_CURRENT_FUNCTION: INDEPENDENT
BATHING ASSESSED: 1
TOILETING: 1
TOILETING: INDEPENDENT
AMBULATION ASSISTANCE: 1

## 2025-01-23 ASSESSMENT — ENCOUNTER SYMPTOMS
DENIES PAIN: 1
PERSON REPORTING PAIN: PATIENT

## 2025-01-29 ENCOUNTER — TELEPHONE (OUTPATIENT)
Dept: PRIMARY CARE | Facility: CLINIC | Age: OVER 89
End: 2025-01-29
Payer: MEDICARE

## 2025-01-30 ENCOUNTER — PATIENT OUTREACH (OUTPATIENT)
Dept: PRIMARY CARE | Facility: CLINIC | Age: OVER 89
End: 2025-01-30
Payer: MEDICARE

## 2025-01-30 ENCOUNTER — TELEPHONE (OUTPATIENT)
Dept: PRIMARY CARE | Facility: CLINIC | Age: OVER 89
End: 2025-01-30
Payer: MEDICARE

## 2025-01-30 NOTE — TELEPHONE ENCOUNTER
Left message with Eliezer letting him know of the compression stocking script and to let us know if he needs anything else from us.

## 2025-02-18 ENCOUNTER — APPOINTMENT (OUTPATIENT)
Dept: PRIMARY CARE | Facility: CLINIC | Age: OVER 89
End: 2025-02-18
Payer: MEDICARE

## 2025-02-18 VITALS
HEART RATE: 121 BPM | HEIGHT: 64 IN | WEIGHT: 123 LBS | OXYGEN SATURATION: 99 % | DIASTOLIC BLOOD PRESSURE: 88 MMHG | RESPIRATION RATE: 17 BRPM | BODY MASS INDEX: 21 KG/M2 | SYSTOLIC BLOOD PRESSURE: 128 MMHG

## 2025-02-18 DIAGNOSIS — K06.8 BLEEDING GUMS: Primary | ICD-10-CM

## 2025-02-18 DIAGNOSIS — I11.0 HYPERTENSIVE HEART DISEASE WITH HEART FAILURE: ICD-10-CM

## 2025-02-18 PROBLEM — R13.12 DYSPHAGIA, OROPHARYNGEAL PHASE: Status: ACTIVE | Noted: 2024-12-05

## 2025-02-18 PROBLEM — R26.81 UNSTEADINESS ON FEET: Status: ACTIVE | Noted: 2024-12-05

## 2025-02-18 PROBLEM — R41.841 COGNITIVE COMMUNICATION DEFICIT: Status: ACTIVE | Noted: 2024-12-05

## 2025-02-18 PROBLEM — R41.0 DELIRIUM: Status: ACTIVE | Noted: 2024-11-22

## 2025-02-18 PROBLEM — K59.04 CHRONIC IDIOPATHIC CONSTIPATION: Status: ACTIVE | Noted: 2024-11-28

## 2025-02-18 PROBLEM — E44.0 MODERATE PROTEIN-CALORIE MALNUTRITION (MULTI): Status: ACTIVE | Noted: 2024-12-05

## 2025-02-18 PROBLEM — E22.2 SIADH (SYNDROME OF INAPPROPRIATE ADH PRODUCTION) (MULTI): Status: ACTIVE | Noted: 2024-11-22

## 2025-02-18 PROBLEM — K62.5 RECTAL BLEED: Status: RESOLVED | Noted: 2024-11-27 | Resolved: 2025-02-18

## 2025-02-18 PROBLEM — R11.2 NAUSEA AND VOMITING: Status: RESOLVED | Noted: 2024-11-21 | Resolved: 2025-02-18

## 2025-02-18 PROCEDURE — 1158F ADVNC CARE PLAN TLK DOCD: CPT | Performed by: FAMILY MEDICINE

## 2025-02-18 PROCEDURE — 1123F ACP DISCUSS/DSCN MKR DOCD: CPT | Performed by: FAMILY MEDICINE

## 2025-02-18 PROCEDURE — 1126F AMNT PAIN NOTED NONE PRSNT: CPT | Performed by: FAMILY MEDICINE

## 2025-02-18 PROCEDURE — G2211 COMPLEX E/M VISIT ADD ON: HCPCS | Performed by: FAMILY MEDICINE

## 2025-02-18 PROCEDURE — 1160F RVW MEDS BY RX/DR IN RCRD: CPT | Performed by: FAMILY MEDICINE

## 2025-02-18 PROCEDURE — 99213 OFFICE O/P EST LOW 20 MIN: CPT | Performed by: FAMILY MEDICINE

## 2025-02-18 PROCEDURE — 1036F TOBACCO NON-USER: CPT | Performed by: FAMILY MEDICINE

## 2025-02-18 PROCEDURE — 3079F DIAST BP 80-89 MM HG: CPT | Performed by: FAMILY MEDICINE

## 2025-02-18 PROCEDURE — 3074F SYST BP LT 130 MM HG: CPT | Performed by: FAMILY MEDICINE

## 2025-02-18 PROCEDURE — 1159F MED LIST DOCD IN RCRD: CPT | Performed by: FAMILY MEDICINE

## 2025-02-18 ASSESSMENT — LIFESTYLE VARIABLES
HOW OFTEN DO YOU HAVE A DRINK CONTAINING ALCOHOL: NEVER
AUDIT-C TOTAL SCORE: 0
SKIP TO QUESTIONS 9-10: 1
HOW MANY STANDARD DRINKS CONTAINING ALCOHOL DO YOU HAVE ON A TYPICAL DAY: PATIENT DOES NOT DRINK
HOW OFTEN DO YOU HAVE SIX OR MORE DRINKS ON ONE OCCASION: NEVER

## 2025-02-18 ASSESSMENT — PATIENT HEALTH QUESTIONNAIRE - PHQ9
1. LITTLE INTEREST OR PLEASURE IN DOING THINGS: NOT AT ALL
2. FEELING DOWN, DEPRESSED OR HOPELESS: NOT AT ALL
SUM OF ALL RESPONSES TO PHQ9 QUESTIONS 1 AND 2: 0

## 2025-02-18 ASSESSMENT — PAIN SCALES - GENERAL: PAINLEVEL_OUTOF10: 0-NO PAIN

## 2025-02-18 ASSESSMENT — COLUMBIA-SUICIDE SEVERITY RATING SCALE - C-SSRS: 1. IN THE PAST MONTH, HAVE YOU WISHED YOU WERE DEAD OR WISHED YOU COULD GO TO SLEEP AND NOT WAKE UP?: NO

## 2025-02-18 NOTE — PROGRESS NOTES
"Subjective     Patient ID: Kavon Bowers is a 89 y.o. male who presents for Follow-up (Pt reports no bleeding, swelling, or redness to gums. No pain reported when eating or talking. Reports slight soreness to extraction sites. Ecchymosis to R lower jaw to R neck d/t teeth extraction.).    HPI  Kavon Bowers is seen for his chronic issues.  Dental work with extraction on 2/10.  Couldn't get the bleeding to stop.  Went to ED at [facility] where they tried TXA soaked gauze and Surgicel without relief. Transferred to [facility] where they packed it several times with surgicel to control the bleeding.  Resume the Eliquis after 2 days.  No further episodes of bleeding.  No pain.      Review of Systems  All other systems have been reviewed and are negative except as noted in the HPI.       Objective  Vitals:  /88   Pulse (!) 121   Resp 17   Ht 1.626 m (5' 4\")   Wt 55.8 kg (123 lb)   SpO2 99%   BMI 21.11 kg/m²   Wt Readings from Last 3 Encounters:   02/18/25 55.8 kg (123 lb)   01/03/25 55.3 kg (122 lb)   12/10/24 65.1 kg (143 lb 8 oz)       Physical Examt  Alert.  No acute distress.  Multiple ecchymoses on face and neck.  No swelling.  2 tooth extraction sites in the upper mouth are healing well without any signs of bleeding or infection.    Assessment/Plan   Assessment & Plan  Bleeding gums  Continue with Eliquis as prescribed.  Discussed if he needs further dental work done or extractions will stop the Eliquis 5 days in advance to ensure he does not have recurrent bleeding.  Advise to leave the small irritation with skin alone in his mouth until he sees the dentist to avoid recurrent episode of bleeding.       Hypertensive heart disease with heart failure  Reprinted order for compression stockings.  Orders:    miscellaneous medical supply misc; Opened Toe compression stockings zip and non-zip knee high 20-30mmHg to be worn daily for venous insufficiency        Follow up PRN, sooner with any problems or concerns.  "

## 2025-02-19 NOTE — ASSESSMENT & PLAN NOTE
Reprinted order for compression stockings.  Orders:    miscellaneous medical supply misc; Opened Toe compression stockings zip and non-zip knee high 20-30mmHg to be worn daily for venous insufficiency

## 2025-03-08 ENCOUNTER — PATIENT OUTREACH (OUTPATIENT)
Dept: PRIMARY CARE | Facility: CLINIC | Age: OVER 89
End: 2025-03-08
Payer: MEDICARE

## 2025-05-11 DIAGNOSIS — N39.0 URINARY TRACT INFECTION WITHOUT HEMATURIA, SITE UNSPECIFIED: ICD-10-CM

## 2025-05-12 RX ORDER — CEPHALEXIN 500 MG/1
500 CAPSULE ORAL DAILY
Qty: 90 CAPSULE | Refills: 1 | Status: SHIPPED | OUTPATIENT
Start: 2025-05-12 | End: 2025-11-08

## 2025-05-20 ENCOUNTER — OFFICE VISIT (OUTPATIENT)
Dept: PRIMARY CARE | Facility: CLINIC | Age: OVER 89
End: 2025-05-20
Payer: MEDICARE

## 2025-05-20 VITALS
DIASTOLIC BLOOD PRESSURE: 82 MMHG | SYSTOLIC BLOOD PRESSURE: 128 MMHG | WEIGHT: 126 LBS | BODY MASS INDEX: 21.63 KG/M2 | HEART RATE: 65 BPM | OXYGEN SATURATION: 97 %

## 2025-05-20 DIAGNOSIS — B37.42 CANDIDAL BALANITIS: Primary | ICD-10-CM

## 2025-05-20 PROCEDURE — 3079F DIAST BP 80-89 MM HG: CPT

## 2025-05-20 PROCEDURE — 1160F RVW MEDS BY RX/DR IN RCRD: CPT

## 2025-05-20 PROCEDURE — 99213 OFFICE O/P EST LOW 20 MIN: CPT

## 2025-05-20 PROCEDURE — 1126F AMNT PAIN NOTED NONE PRSNT: CPT

## 2025-05-20 PROCEDURE — 1036F TOBACCO NON-USER: CPT

## 2025-05-20 PROCEDURE — 1159F MED LIST DOCD IN RCRD: CPT

## 2025-05-20 PROCEDURE — 3074F SYST BP LT 130 MM HG: CPT

## 2025-05-20 RX ORDER — NYSTATIN 100000 U/G
CREAM TOPICAL 2 TIMES DAILY
Qty: 30 G | Refills: 0 | Status: SHIPPED | OUTPATIENT
Start: 2025-05-20 | End: 2025-05-27

## 2025-05-20 ASSESSMENT — PAIN SCALES - GENERAL: PAINLEVEL_OUTOF10: 0-NO PAIN

## 2025-05-20 NOTE — PROGRESS NOTES
"Subjective     Patient ID: Kavon Bowers is a 89 y.o. male who presents for Groin Swelling (Son noticed last night swelling at the tip of his penis with a \"water filled sac\". He reports tenderness and redness. No specific injury ).      HPI  Kavon presents today with his son who provides much of the HPI due to underlying dementia.  They present today with concerns of some swelling noted on Sunday along the shaft of penis.  No redness or pain per patient.  No obvious injuries.  Son admits that the swelling is much improved today.    Patient's recent visit notes, medication and allergy lists, past medical surgical social hx, immunization, vitals, problem list, recent tests were reviewed by me for pertinence to this visit.        Review of Systems  All other systems have been reviewed and are negative except as noted in the HPI.         Objective   /82 (BP Location: Left arm)   Pulse 65   Wt 57.2 kg (126 lb)   SpO2 97%   BMI 21.63 kg/m²       Physical Exam  Vitals and nursing note reviewed.   Constitutional:       General: He is not in acute distress.     Appearance: Normal appearance.   Genitourinary:     Comments: Slight fluid accumulation noted to the underside of penis shaft just below glans.  Slight redness and irritation noted surrounding glans.  Neurological:      Mental Status: He is alert.   Psychiatric:         Behavior: Behavior is cooperative.             Assessment & Plan  Candidal balanitis  Acute new problem  Discussed skin care with patient's son.  Son provides much of HPI due to underlying dementia.  Apply nystatin cream to affected areas BID x7 days as discussed. Explained intended effects, potential side effects, and schedule of dosages of the medication.  Follow up if symptoms persist.   Orders:    nystatin (Mycostatin) cream; Apply topically 2 times a day for 7 days. apply to affected area      Caty Jeronimo, APRN-CNP  "

## 2025-05-27 ENCOUNTER — TELEPHONE (OUTPATIENT)
Dept: PRIMARY CARE | Facility: CLINIC | Age: OVER 89
End: 2025-05-27
Payer: MEDICARE

## 2025-05-27 DIAGNOSIS — K21.9 GASTROESOPHAGEAL REFLUX DISEASE WITHOUT ESOPHAGITIS: ICD-10-CM

## 2025-05-27 NOTE — TELEPHONE ENCOUNTER
Patient is calling in for a refill on Pantoprazole EC 40 mg, last seen 05/20/25.  Please send to [facility] on [address] in [place].

## 2025-05-28 RX ORDER — PANTOPRAZOLE SODIUM 40 MG/1
40 TABLET, DELAYED RELEASE ORAL DAILY
Qty: 90 TABLET | Refills: 3 | Status: SHIPPED | OUTPATIENT
Start: 2025-05-28

## 2025-08-05 ENCOUNTER — NURSING HOME VISIT (OUTPATIENT)
Dept: POST ACUTE CARE | Facility: EXTERNAL LOCATION | Age: OVER 89
End: 2025-08-05
Payer: MEDICARE

## 2025-08-05 ENCOUNTER — NURSING HOME VISIT (OUTPATIENT)
Dept: POST ACUTE CARE | Facility: EXTERNAL LOCATION | Age: OVER 89
End: 2025-08-05

## 2025-08-05 DIAGNOSIS — I10 ESSENTIAL HYPERTENSION: ICD-10-CM

## 2025-08-05 DIAGNOSIS — I48.0 PAROXYSMAL ATRIAL FIBRILLATION (MULTI): Primary | ICD-10-CM

## 2025-08-05 DIAGNOSIS — E22.2 SIADH (SYNDROME OF INAPPROPRIATE ADH PRODUCTION) (MULTI): ICD-10-CM

## 2025-08-05 DIAGNOSIS — E44.0 MODERATE PROTEIN-CALORIE MALNUTRITION (MULTI): ICD-10-CM

## 2025-08-05 DIAGNOSIS — R06.02 SOB (SHORTNESS OF BREATH): Primary | ICD-10-CM

## 2025-08-05 DIAGNOSIS — R41.841 COGNITIVE COMMUNICATION DEFICIT: ICD-10-CM

## 2025-08-05 DIAGNOSIS — E87.1 HYPONATREMIA: ICD-10-CM

## 2025-08-05 PROCEDURE — 99306 1ST NF CARE HIGH MDM 50: CPT | Performed by: FAMILY MEDICINE

## 2025-08-05 PROCEDURE — 99309 SBSQ NF CARE MODERATE MDM 30: CPT | Performed by: NURSE PRACTITIONER

## 2025-08-05 ASSESSMENT — ENCOUNTER SYMPTOMS
ABDOMINAL PAIN: 0
WEAKNESS: 1
COUGH: 0
BACK PAIN: 1
PALPITATIONS: 0
DIZZINESS: 0
SHORTNESS OF BREATH: 0
TROUBLE SWALLOWING: 1

## 2025-08-05 NOTE — LETTER
Patient: Kavon Bowers  : 1933    Encounter Date: 2025    Subjective  Patient ID: Kavon Bowers is a 89 y.o. male who is acute skilled care being seen and evaluated for multiple medical problems.    HPI pt here for rehab after stay for mental status changes. He hd not been eating and drinking as well prior to admission. He was put on iv abx and fluids. Labs here show hgb 13, total bili 1.7 with nl alt ast and alk phos and potassium better- was 3.3 in hospital. No new issues per staff.     Review of Systems   HENT:  Positive for trouble swallowing.    Respiratory:  Negative for cough and shortness of breath.    Cardiovascular:  Negative for chest pain and palpitations.   Gastrointestinal:  Negative for abdominal pain.   Musculoskeletal:  Positive for back pain and gait problem.   Neurological:  Positive for weakness. Negative for dizziness.   All other systems reviewed and are negative.      Objective  There were no vitals taken for this visit.    Physical Exam  Vitals reviewed: 132/84 97.5 93 wt 110.   Constitutional:       General: He is not in acute distress.     Appearance: He is not ill-appearing.   HENT:      Mouth/Throat:      Mouth: Mucous membranes are moist.     Eyes:      Conjunctiva/sclera: Conjunctivae normal.       Cardiovascular:      Rate and Rhythm: Normal rate and regular rhythm.   Pulmonary:      Effort: Pulmonary effort is normal.      Breath sounds: Normal breath sounds.     Skin:     General: Skin is warm and dry.     Neurological:      Mental Status: He is alert. Mental status is at baseline.     Psychiatric:         Mood and Affect: Affect is flat.         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

## 2025-08-05 NOTE — LETTER
Patient: Kavon Bowers  : 1933    Encounter Date: 2025    Subjective  Patient ID: Kavon Bowers is a 89 y.o. male who presents for Shortness of Breath.    Asked to see patient who had SOB in therapy. Therapist states that his pulse ox decreased and he was SOB. O2 was placed and patient had pulse ox rechecked by nursing staff and it came up to 93% on O2. He denies cough, He has been afebrile. He takes keflex prophylactically for UTI prevention. Labs 25 reviewed. History of dysphagia on a regular diet and thin liquids.          Review of Systems    Objective  /89   Pulse 93   Temp 36.3 °C (97.3 °F)   Resp 17   SpO2 95%     Physical Exam  Constitutional:       General: He is not in acute distress.     Appearance: He is not ill-appearing.   HENT:      Mouth/Throat:      Mouth: Mucous membranes are moist.     Cardiovascular:      Rate and Rhythm: Normal rate.   Pulmonary:      Effort: Pulmonary effort is normal.      Breath sounds: No wheezing, rhonchi or rales.   Abdominal:      General: There is no distension.      Palpations: Abdomen is soft.      Tenderness: There is no abdominal tenderness.     Musculoskeletal:      Comments: Seen in WC, generally weaker LE     Skin:     General: Skin is warm and dry.     Neurological:      Mental Status: He is alert.     Psychiatric:         Mood and Affect: Affect is flat.         Assessment/Plan  Diagnoses and all orders for this visit:  SOB (shortness of breath)  Comments:  CXR, monitor pulse ox, O2 as needed for pulse ox less than 90%         Electronically Signed By: SHELLEY Wilson   25 11:08 AM

## 2025-08-06 VITALS
SYSTOLIC BLOOD PRESSURE: 132 MMHG | HEART RATE: 93 BPM | DIASTOLIC BLOOD PRESSURE: 89 MMHG | RESPIRATION RATE: 17 BRPM | OXYGEN SATURATION: 95 % | TEMPERATURE: 97.3 F

## 2025-08-06 NOTE — PROGRESS NOTES
Subjective   Patient ID: Kavon Bowers is a 89 y.o. male who is acute skilled care being seen and evaluated for multiple medical problems.    HPI pt here for rehab after stay for mental status changes. He hd not been eating and drinking as well prior to admission. He was put on iv abx and fluids. Labs here show hgb 13, total bili 1.7 with nl alt ast and alk phos and potassium better- was 3.3 in hospital. No new issues per staff.     Review of Systems   HENT:  Positive for trouble swallowing.    Respiratory:  Negative for cough and shortness of breath.    Cardiovascular:  Negative for chest pain and palpitations.   Gastrointestinal:  Negative for abdominal pain.   Musculoskeletal:  Positive for back pain and gait problem.   Neurological:  Positive for weakness. Negative for dizziness.   All other systems reviewed and are negative.      Objective   There were no vitals taken for this visit.    Physical Exam  Vitals reviewed: 132/84 97.5 93 wt 110.   Constitutional:       General: He is not in acute distress.     Appearance: He is not ill-appearing.   HENT:      Mouth/Throat:      Mouth: Mucous membranes are moist.     Eyes:      Conjunctiva/sclera: Conjunctivae normal.       Cardiovascular:      Rate and Rhythm: Normal rate and regular rhythm.   Pulmonary:      Effort: Pulmonary effort is normal.      Breath sounds: Normal breath sounds.     Skin:     General: Skin is warm and dry.     Neurological:      Mental Status: He is alert. Mental status is at baseline.     Psychiatric:         Mood and Affect: Affect is flat.         Assessment/Plan   Problem List Items Addressed This Visit           ICD-10-CM    Atrial fibrillation (Multi) - Primary I48.91    Essential hypertension I10    Hyponatremia E87.1    Cognitive communication deficit R41.841    Moderate protein-calorie malnutrition (Multi) E44.0    SIADH (syndrome of inappropriate ADH production) (Multi) E22.2   Recheck cbc cmp  Monitor lfts and sodium  Continue  therapies     Goals    None

## 2025-08-06 NOTE — PROGRESS NOTES
Subjective   Patient ID: Kavon Bowers is a 89 y.o. male who presents for Shortness of Breath.    Asked to see patient who had SOB in therapy. Therapist states that his pulse ox decreased and he was SOB. O2 was placed and patient had pulse ox rechecked by nursing staff and it came up to 93% on O2. He denies cough, He has been afebrile. He takes keflex prophylactically for UTI prevention. Labs 8/4/25 reviewed. History of dysphagia on a regular diet and thin liquids.          Review of Systems    Objective   /89   Pulse 93   Temp 36.3 °C (97.3 °F)   Resp 17   SpO2 95%     Physical Exam  Constitutional:       General: He is not in acute distress.     Appearance: He is not ill-appearing.   HENT:      Mouth/Throat:      Mouth: Mucous membranes are moist.     Cardiovascular:      Rate and Rhythm: Normal rate.   Pulmonary:      Effort: Pulmonary effort is normal.      Breath sounds: No wheezing, rhonchi or rales.   Abdominal:      General: There is no distension.      Palpations: Abdomen is soft.      Tenderness: There is no abdominal tenderness.     Musculoskeletal:      Comments: Seen in WC, generally weaker LE     Skin:     General: Skin is warm and dry.     Neurological:      Mental Status: He is alert.     Psychiatric:         Mood and Affect: Affect is flat.         Assessment/Plan   Diagnoses and all orders for this visit:  SOB (shortness of breath)  Comments:  CXR, monitor pulse ox, O2 as needed for pulse ox less than 90%

## 2025-08-12 ENCOUNTER — NURSING HOME VISIT (OUTPATIENT)
Dept: POST ACUTE CARE | Facility: EXTERNAL LOCATION | Age: OVER 89
End: 2025-08-12
Payer: MEDICARE

## 2025-08-12 VITALS
DIASTOLIC BLOOD PRESSURE: 80 MMHG | SYSTOLIC BLOOD PRESSURE: 122 MMHG | TEMPERATURE: 97.7 F | WEIGHT: 121.8 LBS | RESPIRATION RATE: 16 BRPM | HEART RATE: 75 BPM | BODY MASS INDEX: 20.91 KG/M2 | OXYGEN SATURATION: 95 %

## 2025-08-12 DIAGNOSIS — J18.9 PNEUMONIA OF BOTH UPPER LOBES DUE TO INFECTIOUS ORGANISM: Primary | ICD-10-CM

## 2025-08-12 PROCEDURE — 99308 SBSQ NF CARE LOW MDM 20: CPT | Performed by: NURSE PRACTITIONER

## 2025-08-12 ASSESSMENT — ENCOUNTER SYMPTOMS
SHORTNESS OF BREATH: 0
COUGH: 0
WHEEZING: 0